# Patient Record
Sex: MALE | Race: OTHER | HISPANIC OR LATINO | Employment: UNEMPLOYED | URBAN - METROPOLITAN AREA
[De-identification: names, ages, dates, MRNs, and addresses within clinical notes are randomized per-mention and may not be internally consistent; named-entity substitution may affect disease eponyms.]

---

## 2020-07-06 ENCOUNTER — APPOINTMENT (EMERGENCY)
Dept: ULTRASOUND IMAGING | Facility: HOSPITAL | Age: 50
DRG: 192 | End: 2020-07-06
Payer: COMMERCIAL

## 2020-07-06 ENCOUNTER — HOSPITAL ENCOUNTER (INPATIENT)
Facility: HOSPITAL | Age: 50
LOS: 5 days | Discharge: HOME | DRG: 192 | End: 2020-07-11
Attending: EMERGENCY MEDICINE | Admitting: GENERAL PRACTICE
Payer: COMMERCIAL

## 2020-07-06 ENCOUNTER — APPOINTMENT (EMERGENCY)
Dept: RADIOLOGY | Facility: HOSPITAL | Age: 50
DRG: 192 | End: 2020-07-06
Payer: COMMERCIAL

## 2020-07-06 ENCOUNTER — APPOINTMENT (EMERGENCY)
Dept: CT IMAGING | Facility: HOSPITAL | Age: 50
DRG: 192 | End: 2020-07-06
Payer: COMMERCIAL

## 2020-07-06 DIAGNOSIS — I50.9 CONGESTIVE HEART FAILURE (CHF) (HCC): ICD-10-CM

## 2020-07-06 DIAGNOSIS — R60.0 BILATERAL LOWER EXTREMITY EDEMA: ICD-10-CM

## 2020-07-06 DIAGNOSIS — R79.89 ELEVATED SERUM CREATININE: ICD-10-CM

## 2020-07-06 DIAGNOSIS — L03.115 CELLULITIS OF RIGHT LOWER EXTREMITY: Primary | ICD-10-CM

## 2020-07-06 DIAGNOSIS — I10 HYPERTENSION: ICD-10-CM

## 2020-07-06 DIAGNOSIS — I50.31 ACUTE DIASTOLIC HEART FAILURE (HCC): ICD-10-CM

## 2020-07-06 PROBLEM — R35.89 POLYURIA: Status: ACTIVE | Noted: 2020-07-06

## 2020-07-06 PROBLEM — L03.116 CELLULITIS OF LEFT LOWER EXTREMITY: Status: ACTIVE | Noted: 2020-07-06

## 2020-07-06 PROBLEM — E66.01 MORBID OBESITY DUE TO EXCESS CALORIES (HCC): Status: ACTIVE | Noted: 2020-07-06

## 2020-07-06 PROBLEM — I16.0 HYPERTENSIVE URGENCY: Status: ACTIVE | Noted: 2020-07-06

## 2020-07-06 LAB
ALBUMIN SERPL BCP-MCNC: 3.4 G/DL (ref 3.5–5)
ALP SERPL-CCNC: 112 U/L (ref 46–116)
ALT SERPL W P-5'-P-CCNC: 31 U/L (ref 12–78)
ANION GAP SERPL CALCULATED.3IONS-SCNC: 5 MMOL/L (ref 4–13)
APTT PPP: 35 SECONDS (ref 23–37)
AST SERPL W P-5'-P-CCNC: 37 U/L (ref 5–45)
BASOPHILS # BLD AUTO: 0.04 THOUSANDS/ΜL (ref 0–0.1)
BASOPHILS NFR BLD AUTO: 0 % (ref 0–1)
BILIRUB SERPL-MCNC: 0.7 MG/DL (ref 0.2–1)
BUN SERPL-MCNC: 24 MG/DL (ref 5–25)
CALCIUM SERPL-MCNC: 8.7 MG/DL (ref 8.3–10.1)
CHLORIDE SERPL-SCNC: 107 MMOL/L (ref 100–108)
CO2 SERPL-SCNC: 29 MMOL/L (ref 21–32)
CREAT SERPL-MCNC: 1.98 MG/DL (ref 0.6–1.3)
D DIMER PPP FEU-MCNC: 2.02 UG/ML FEU
EOSINOPHIL # BLD AUTO: 0.06 THOUSAND/ΜL (ref 0–0.61)
EOSINOPHIL NFR BLD AUTO: 1 % (ref 0–6)
ERYTHROCYTE [DISTWIDTH] IN BLOOD BY AUTOMATED COUNT: 15 % (ref 11.6–15.1)
GFR SERPL CREATININE-BSD FRML MDRD: 38 ML/MIN/1.73SQ M
GLUCOSE SERPL-MCNC: 92 MG/DL (ref 65–140)
HCT VFR BLD AUTO: 50.4 % (ref 36.5–49.3)
HGB BLD-MCNC: 15.4 G/DL (ref 12–17)
IMM GRANULOCYTES # BLD AUTO: 0.04 THOUSAND/UL (ref 0–0.2)
IMM GRANULOCYTES NFR BLD AUTO: 0 % (ref 0–2)
INR PPP: 1.1 (ref 0.84–1.19)
LACTATE SERPL-SCNC: 0.7 MMOL/L (ref 0.5–2)
LYMPHOCYTES # BLD AUTO: 1.3 THOUSANDS/ΜL (ref 0.6–4.47)
LYMPHOCYTES NFR BLD AUTO: 14 % (ref 14–44)
MCH RBC QN AUTO: 26.2 PG (ref 26.8–34.3)
MCHC RBC AUTO-ENTMCNC: 30.6 G/DL (ref 31.4–37.4)
MCV RBC AUTO: 86 FL (ref 82–98)
MONOCYTES # BLD AUTO: 0.79 THOUSAND/ΜL (ref 0.17–1.22)
MONOCYTES NFR BLD AUTO: 9 % (ref 4–12)
NEUTROPHILS # BLD AUTO: 6.8 THOUSANDS/ΜL (ref 1.85–7.62)
NEUTS SEG NFR BLD AUTO: 76 % (ref 43–75)
NRBC BLD AUTO-RTO: 0 /100 WBCS
NT-PROBNP SERPL-MCNC: 1630 PG/ML
PLATELET # BLD AUTO: 202 THOUSANDS/UL (ref 149–390)
PMV BLD AUTO: 11.4 FL (ref 8.9–12.7)
POTASSIUM SERPL-SCNC: 5.1 MMOL/L (ref 3.5–5.3)
PROT SERPL-MCNC: 7.8 G/DL (ref 6.4–8.2)
PROTHROMBIN TIME: 14.4 SECONDS (ref 11.6–14.5)
RBC # BLD AUTO: 5.87 MILLION/UL (ref 3.88–5.62)
SARS-COV-2 RNA RESP QL NAA+PROBE: NEGATIVE
SODIUM SERPL-SCNC: 141 MMOL/L (ref 136–145)
TROPONIN I SERPL-MCNC: 0.04 NG/ML
TSH SERPL DL<=0.05 MIU/L-ACNC: 1.65 UIU/ML (ref 0.36–3.74)
WBC # BLD AUTO: 9.03 THOUSAND/UL (ref 4.31–10.16)

## 2020-07-06 PROCEDURE — 99284 EMERGENCY DEPT VISIT MOD MDM: CPT | Performed by: EMERGENCY MEDICINE

## 2020-07-06 PROCEDURE — 87635 SARS-COV-2 COVID-19 AMP PRB: CPT | Performed by: EMERGENCY MEDICINE

## 2020-07-06 PROCEDURE — 71275 CT ANGIOGRAPHY CHEST: CPT

## 2020-07-06 PROCEDURE — 87040 BLOOD CULTURE FOR BACTERIA: CPT | Performed by: EMERGENCY MEDICINE

## 2020-07-06 PROCEDURE — 85730 THROMBOPLASTIN TIME PARTIAL: CPT | Performed by: EMERGENCY MEDICINE

## 2020-07-06 PROCEDURE — 99285 EMERGENCY DEPT VISIT HI MDM: CPT

## 2020-07-06 PROCEDURE — 96365 THER/PROPH/DIAG IV INF INIT: CPT

## 2020-07-06 PROCEDURE — 93005 ELECTROCARDIOGRAM TRACING: CPT

## 2020-07-06 PROCEDURE — 85025 COMPLETE CBC W/AUTO DIFF WBC: CPT | Performed by: EMERGENCY MEDICINE

## 2020-07-06 PROCEDURE — 71045 X-RAY EXAM CHEST 1 VIEW: CPT

## 2020-07-06 PROCEDURE — 85610 PROTHROMBIN TIME: CPT | Performed by: EMERGENCY MEDICINE

## 2020-07-06 PROCEDURE — 84484 ASSAY OF TROPONIN QUANT: CPT | Performed by: EMERGENCY MEDICINE

## 2020-07-06 PROCEDURE — 96366 THER/PROPH/DIAG IV INF ADDON: CPT

## 2020-07-06 PROCEDURE — 93971 EXTREMITY STUDY: CPT

## 2020-07-06 PROCEDURE — 96375 TX/PRO/DX INJ NEW DRUG ADDON: CPT

## 2020-07-06 PROCEDURE — 85379 FIBRIN DEGRADATION QUANT: CPT | Performed by: EMERGENCY MEDICINE

## 2020-07-06 PROCEDURE — 80053 COMPREHEN METABOLIC PANEL: CPT | Performed by: EMERGENCY MEDICINE

## 2020-07-06 PROCEDURE — 36415 COLL VENOUS BLD VENIPUNCTURE: CPT | Performed by: EMERGENCY MEDICINE

## 2020-07-06 PROCEDURE — 84443 ASSAY THYROID STIM HORMONE: CPT | Performed by: EMERGENCY MEDICINE

## 2020-07-06 PROCEDURE — 99223 1ST HOSP IP/OBS HIGH 75: CPT | Performed by: GENERAL PRACTICE

## 2020-07-06 PROCEDURE — 83605 ASSAY OF LACTIC ACID: CPT | Performed by: EMERGENCY MEDICINE

## 2020-07-06 PROCEDURE — 83880 ASSAY OF NATRIURETIC PEPTIDE: CPT | Performed by: EMERGENCY MEDICINE

## 2020-07-06 RX ORDER — LABETALOL 20 MG/4 ML (5 MG/ML) INTRAVENOUS SYRINGE
20 ONCE
Status: COMPLETED | OUTPATIENT
Start: 2020-07-06 | End: 2020-07-06

## 2020-07-06 RX ORDER — LABETALOL 20 MG/4 ML (5 MG/ML) INTRAVENOUS SYRINGE
10 ONCE
Status: COMPLETED | OUTPATIENT
Start: 2020-07-06 | End: 2020-07-06

## 2020-07-06 RX ORDER — ACETAMINOPHEN 325 MG/1
650 TABLET ORAL EVERY 6 HOURS PRN
Status: DISCONTINUED | OUTPATIENT
Start: 2020-07-06 | End: 2020-07-11 | Stop reason: HOSPADM

## 2020-07-06 RX ORDER — FUROSEMIDE 10 MG/ML
40 INJECTION INTRAMUSCULAR; INTRAVENOUS
Status: DISCONTINUED | OUTPATIENT
Start: 2020-07-07 | End: 2020-07-07

## 2020-07-06 RX ORDER — FUROSEMIDE 10 MG/ML
40 INJECTION INTRAMUSCULAR; INTRAVENOUS ONCE
Status: COMPLETED | OUTPATIENT
Start: 2020-07-06 | End: 2020-07-06

## 2020-07-06 RX ORDER — CEFAZOLIN SODIUM 2 G/50ML
2000 SOLUTION INTRAVENOUS EVERY 8 HOURS
Status: DISCONTINUED | OUTPATIENT
Start: 2020-07-07 | End: 2020-07-09

## 2020-07-06 RX ORDER — LABETALOL 20 MG/4 ML (5 MG/ML) INTRAVENOUS SYRINGE
20 EVERY 4 HOURS PRN
Status: DISCONTINUED | OUTPATIENT
Start: 2020-07-06 | End: 2020-07-07

## 2020-07-06 RX ADMIN — LABETALOL 20 MG/4 ML (5 MG/ML) INTRAVENOUS SYRINGE 20 MG: at 17:29

## 2020-07-06 RX ADMIN — Medication 1000 MG: at 15:40

## 2020-07-06 RX ADMIN — LABETALOL 20 MG/4 ML (5 MG/ML) INTRAVENOUS SYRINGE 10 MG: at 15:57

## 2020-07-06 RX ADMIN — ENOXAPARIN SODIUM 60 MG: 60 INJECTION SUBCUTANEOUS at 19:25

## 2020-07-06 RX ADMIN — SODIUM CHLORIDE 1000 ML: 0.9 INJECTION, SOLUTION INTRAVENOUS at 15:56

## 2020-07-06 RX ADMIN — FUROSEMIDE 40 MG: 10 INJECTION, SOLUTION INTRAMUSCULAR; INTRAVENOUS at 17:34

## 2020-07-06 RX ADMIN — IOHEXOL 85 ML: 350 INJECTION, SOLUTION INTRAVENOUS at 16:13

## 2020-07-06 NOTE — PLAN OF CARE
Problem: Potential for Falls  Goal: Patient will remain free of falls  Description  INTERVENTIONS:  - Assess patient frequently for physical needs  -  Identify cognitive and physical deficits and behaviors that affect risk of falls    -  Uriah fall precautions as indicated by assessment   - Educate patient/family on patient safety including physical limitations  - Instruct patient to call for assistance with activity based on assessment  - Modify environment to reduce risk of injury  - Consider OT/PT consult to assist with strengthening/mobility  Outcome: Progressing     Problem: PAIN - ADULT  Goal: Verbalizes/displays adequate comfort level or baseline comfort level  Description  Interventions:  - Encourage patient to monitor pain and request assistance  - Assess pain using appropriate pain scale  - Administer analgesics based on type and severity of pain and evaluate response  - Implement non-pharmacological measures as appropriate and evaluate response  - Consider cultural and social influences on pain and pain management  - Notify physician/advanced practitioner if interventions unsuccessful or patient reports new pain  Outcome: Progressing     Problem: INFECTION - ADULT  Goal: Absence or prevention of progression during hospitalization  Description  INTERVENTIONS:  - Assess and monitor for signs and symptoms of infection  - Monitor lab/diagnostic results  - Monitor all insertion sites, i e  indwelling lines, tubes, and drains  - Monitor endotracheal if appropriate and nasal secretions for changes in amount and color  - Uriah appropriate cooling/warming therapies per order  - Administer medications as ordered  - Instruct and encourage patient and family to use good hand hygiene technique  - Identify and instruct in appropriate isolation precautions for identified infection/condition  Outcome: Progressing  Goal: Absence of fever/infection during neutropenic period  Description  INTERVENTIONS:  - Monitor WBC    Outcome: Progressing     Problem: SAFETY ADULT  Goal: Maintain or return to baseline ADL function  Description  INTERVENTIONS:  -  Assess patient's ability to carry out ADLs; assess patient's baseline for ADL function and identify physical deficits which impact ability to perform ADLs (bathing, care of mouth/teeth, toileting, grooming, dressing, etc )  - Assess/evaluate cause of self-care deficits   - Assess range of motion  - Assess patient's mobility; develop plan if impaired  - Assess patient's need for assistive devices and provide as appropriate  - Encourage maximum independence but intervene and supervise when necessary  - Involve family in performance of ADLs  - Assess for home care needs following discharge   - Consider OT consult to assist with ADL evaluation and planning for discharge  - Provide patient education as appropriate  Outcome: Progressing  Goal: Maintain or return mobility status to optimal level  Description  INTERVENTIONS:  - Assess patient's baseline mobility status (ambulation, transfers, stairs, etc )    - Identify cognitive and physical deficits and behaviors that affect mobility  - Identify mobility aids required to assist with transfers and/or ambulation (gait belt, sit-to-stand, lift, walker, cane, etc )  - Clemons fall precautions as indicated by assessment  - Record patient progress and toleration of activity level on Mobility SBAR; progress patient to next Phase/Stage  - Instruct patient to call for assistance with activity based on assessment  - Consider rehabilitation consult to assist with strengthening/weightbearing, etc   Outcome: Progressing     Problem: DISCHARGE PLANNING  Goal: Discharge to home or other facility with appropriate resources  Description  INTERVENTIONS:  - Identify barriers to discharge w/patient and caregiver  - Arrange for needed discharge resources and transportation as appropriate  - Identify discharge learning needs (meds, wound care, etc )  - Arrange for interpretive services to assist at discharge as needed  - Refer to Case Management Department for coordinating discharge planning if the patient needs post-hospital services based on physician/advanced practitioner order or complex needs related to functional status, cognitive ability, or social support system  Outcome: Progressing     Problem: Knowledge Deficit  Goal: Patient/family/caregiver demonstrates understanding of disease process, treatment plan, medications, and discharge instructions  Description  Complete learning assessment and assess knowledge base    Interventions:  - Provide teaching at level of understanding  - Provide teaching via preferred learning methods  Outcome: Progressing

## 2020-07-06 NOTE — ED NOTES
CC- Increased SOB, increased BP with unilateral (Right) Leg swelling  Admission related to injury?-     Orientation status- AAOx4    Abnormal labs/abnormal focused assessment/vitals-  Increased DDimer and BNP    Medication/drips- Pt had 2 doses of normodyne first dose (10 mg) then 20 mg afterwards  Pt currently was monitored for BP changes  40 mg Lasix given as well       Last time narcotics given-     IV lines/drains/etc- 20G RAC    Isolation status- N/A    Skin- Right leg swelling    BMAT screening tool-?     ED nurse's name and phone number- Natty Gomez 83, [phone]  07/06/20 5429

## 2020-07-06 NOTE — ED PROVIDER NOTES
History  Chief Complaint   Patient presents with    Leg Swelling     RLE swelling x1 month, new onset cough and intermittent fevers  report recent covid negative  52yo male without known medical problems, said he had "borderline" high blood pressure and DM, is coming in with bilateral leg swelling for past few weeks, but right leg worsened and became red over the past week and started with low grade fevers  History provided by:  Patient  Leg Pain   Location:  Leg  Time since incident:  1 month  Injury: no    Leg location:  R lower leg  Pain details:     Quality:  Aching and shooting    Radiates to:  Does not radiate    Severity:  Moderate    Onset quality:  Gradual  Chronicity:  New  Dislocation: no    Prior injury to area:  No  Relieved by:  Nothing  Exacerbated by: scratching  Ineffective treatments:  None tried  Associated symptoms: fever (intermittent low grade 99 fever) and swelling (bilateral leg swelling for weeks, but right leg worsened and got more red in the past week)    Associated symptoms: no back pain and no decreased ROM    Risk factors: obesity        None       Past Medical History:   Diagnosis Date    Hypertension     Obese        History reviewed  No pertinent surgical history  History reviewed  No pertinent family history  I have reviewed and agree with the history as documented  E-Cigarette/Vaping    E-Cigarette Use Never User      E-Cigarette/Vaping Substances     Social History     Tobacco Use    Smoking status: Never Smoker    Smokeless tobacco: Never Used   Substance Use Topics    Alcohol use: Not Currently    Drug use: Not Currently       Review of Systems   Constitutional: Positive for fever (intermittent low grade 99 fever)  Musculoskeletal: Negative for back pain  All other systems reviewed and are negative  Physical Exam  Physical Exam   Constitutional: He is oriented to person, place, and time  He appears well-developed and well-nourished     Morbidly obese   HENT:   Head: Normocephalic and atraumatic  Eyes: Pupils are equal, round, and reactive to light  EOM are normal    Neck: Neck supple  Cardiovascular: Normal rate and regular rhythm  Pulmonary/Chest: Effort normal  Tachypnea noted  No respiratory distress  Abdominal: Soft  Bowel sounds are normal  He exhibits no distension  There is no tenderness  Musculoskeletal: He exhibits edema (2+ pitting edema bilateral, right leg worse than left and right leg with erythema and few folliclulitis pustules and some weeping c/w cellulitis in right leg)  Neurological: He is alert and oriented to person, place, and time  No cranial nerve deficit  He exhibits normal muscle tone  Skin: There is erythema (to RLE)  Vitals reviewed        Vital Signs  ED Triage Vitals   Temperature Pulse Respirations Blood Pressure SpO2   07/06/20 1307 07/06/20 1307 07/06/20 1307 07/06/20 1308 07/06/20 1307   98 °F (36 7 °C) 98 (!) 24 (!) 237/121 94 %      Temp src Heart Rate Source Patient Position - Orthostatic VS BP Location FiO2 (%)   -- 07/06/20 1354 07/06/20 1354 07/06/20 1354 --    Monitor Lying Left arm       Pain Score       07/06/20 1307       No Pain           Vitals:    07/06/20 1354 07/06/20 1400 07/06/20 1430 07/06/20 1445   BP: (!) 221/134 (!) 220/123 (!) 215/104 (!) 213/103   Pulse: 99 100 92 92   Patient Position - Orthostatic VS: Lying Lying Lying Lying         Visual Acuity      ED Medications  Medications   ceftriaxone (ROCEPHIN) 1 g/50 mL in dextrose IVPB (has no administration in time range)   Labetalol HCl (NORMODYNE) injection 20 mg (has no administration in time range)   furosemide (LASIX) injection 40 mg (has no administration in time range)   sodium chloride 0 9 % bolus 1,000 mL (1,000 mL Intravenous New Bag 7/6/20 1556)   Labetalol HCl (NORMODYNE) injection 10 mg (10 mg Intravenous Given 7/6/20 1557)   iohexol (OMNIPAQUE) 350 MG/ML injection (MULTI-DOSE) 85 mL (85 mL Intravenous Given 7/6/20 1613) Diagnostic Studies  Results Reviewed     Procedure Component Value Units Date/Time    Lactic acid [[id number]]  (Normal) Collected:  07/06/20 1555    Lab Status:  Final result Specimen:  Blood from Arm, Left Updated:  07/06/20 1626     LACTIC ACID 0 7 mmol/L     Narrative:       Result may be elevated if tourniquet was used during collection  Blood culture #1 [[id number]] Collected:  07/06/20 1555    Lab Status: In process Specimen:  Blood from Arm, Left Updated:  07/06/20 1603    Blood culture #2 [[id number]] Collected:  07/06/20 1540    Lab Status: In process Specimen:  Blood from Hand, Right Updated:  07/06/20 1545    Novel Coronavirus (Covid-19),PCR [facility] [[id number]]  (Normal) Collected:  07/06/20 1358    Lab Status:  Final result Specimen:  Nares from Nose Updated:  07/06/20 1508     SARS-CoV-2 Negative    Narrative: The specimen collection materials, transport medium, and/or testing methodology utilized in the production of these test results have been proven to be reliable in a limited validation with an abbreviated program under the Emergency Utilization Authorization provided by the FDA  Testing reported as "Presumptive positive" will be confirmed with secondary testing with a reference laboratory to ensure result accuracy  Clinical caution and judgement should be used with the interpretation of these results with consideration of the clinical impression and other laboratory testing  Testing reported as "Positive" or "Negative" has been proven to be accurate according to standard laboratory validation requirements  All testing is performed with control materials showing appropriate reactivity at standard intervals        Comprehensive metabolic panel [[id number]]  (Abnormal) Collected:  07/06/20 1358    Lab Status:  Final result Specimen:  Blood from Arm, Right Updated:  07/06/20 1437     Sodium 141 mmol/L      Potassium 5 1 mmol/L      Chloride 107 mmol/L      CO2 29 mmol/L      ANION GAP 5 mmol/L      BUN 24 mg/dL      Creatinine 1 98 mg/dL      Glucose 92 mg/dL      Calcium 8 7 mg/dL      AST 37 U/L      ALT 31 U/L      Alkaline Phosphatase 112 U/L      Total Protein 7 8 g/dL      Albumin 3 4 g/dL      Total Bilirubin 0 70 mg/dL      eGFR 38 ml/min/1 73sq m     Narrative:       [place] guidelines for Chronic Kidney Disease (CKD):     Stage 1 with normal or high GFR (GFR > 90 mL/min/1 73 square meters)    Stage 2 Mild CKD (GFR = 60-89 mL/min/1 73 square meters)    Stage 3A Moderate CKD (GFR = 45-59 mL/min/1 73 square meters)    Stage 3B Moderate CKD (GFR = 30-44 mL/min/1 73 square meters)    Stage 4 Severe CKD (GFR = 15-29 mL/min/1 73 square meters)    Stage 5 End Stage CKD (GFR <15 mL/min/1 73 square meters)  Note: GFR calculation is accurate only with a steady state creatinine    TSH [[id number]]  (Normal) Collected:  07/06/20 1358    Lab Status:  Final result Specimen:  Blood from Arm, Right Updated:  07/06/20 1437     TSH 3RD GENERATON 1 651 uIU/mL     Narrative:       Patients undergoing fluorescein dye angiography may retain small amounts of fluorescein in the body for 48-72 hours post procedure  Samples containing fluorescein can produce falsely depressed TSH values  If the patient had this procedure,a specimen should be resubmitted post fluorescein clearance        NT-BNP PRO [[id number]]  (Abnormal) Collected:  07/06/20 1358    Lab Status:  Final result Specimen:  Blood from Arm, Right Updated:  07/06/20 1437     NT-proBNP 1,630 pg/mL     D-Dimer [[id number]]  (Abnormal) Collected:  07/06/20 1358    Lab Status:  Final result Specimen:  Blood from Arm, Right Updated:  07/06/20 1429     D-Dimer, Quant 2 02 ug/ml FEU     Troponin I [[id number]]  (Normal) Collected:  07/06/20 1358    Lab Status:  Final result Specimen:  Blood from Arm, Right Updated:  07/06/20 1429     Troponin I 0 04 ng/mL     Protime-INR [[id number]]  (Normal) Collected:  07/06/20 1358    Lab Status: Final result Specimen:  Blood from Arm, Right Updated:  07/06/20 1422     Protime 14 4 seconds      INR 1 10    APTT [[id number]]  (Normal) Collected:  07/06/20 1358    Lab Status:  Final result Specimen:  Blood from Arm, Right Updated:  07/06/20 1422     PTT 35 seconds     CBC and differential [[id number]]  (Abnormal) Collected:  07/06/20 1358    Lab Status:  Final result Specimen:  Blood from Arm, Right Updated:  07/06/20 1410     WBC 9 03 Thousand/uL      RBC 5 87 Million/uL      Hemoglobin 15 4 g/dL      Hematocrit 50 4 %      MCV 86 fL      MCH 26 2 pg      MCHC 30 6 g/dL      RDW 15 0 %      MPV 11 4 fL      Platelets 192 Thousands/uL      nRBC 0 /100 WBCs      Neutrophils Relative 76 %      Immat GRANS % 0 %      Lymphocytes Relative 14 %      Monocytes Relative 9 %      Eosinophils Relative 1 %      Basophils Relative 0 %      Neutrophils Absolute 6 80 Thousands/µL      Immature Grans Absolute 0 04 Thousand/uL      Lymphocytes Absolute 1 30 Thousands/µL      Monocytes Absolute 0 79 Thousand/µL      Eosinophils Absolute 0 06 Thousand/µL      Basophils Absolute 0 04 Thousands/µL                  CTA ED chest PE study   Final Result by Alta Briseno MD (07/06 1648)      No evidence for pulmonary embolism  Mild pulmonary vascular congestion  Small pericardial effusion  Workstation performed: [id number]         XR chest 1 view portable   Final Result by Lashell Mendez MD (07/06 1415)      No acute cardiopulmonary disease              Workstation performed: [id number]         VAS lower limb venous duplex study, unilateral/limited    (Results Pending)              Procedures  ECG 12 Lead Documentation Only  Date/Time: 7/6/2020 1:58 PM  Performed by: Clyde Amato MD  Authorized by: Clyde Amato MD     Indications / Diagnosis:  Dyspnea  Patient location:  ED  Rate:     ECG rate:  97    ECG rate assessment: normal    Rhythm:     Rhythm: sinus rhythm    ST segments:     ST segments:  Normal  T waves: T waves: normal               ED Course                                             MDM  Number of Diagnoses or Management Options  Bilateral lower extremity edema: new and requires workup  Cellulitis of right lower extremity: new and requires workup  Congestive heart failure (CHF) ([facility] [address] ): new and requires workup  Hypertension: new and requires workup     Amount and/or Complexity of Data Reviewed  Clinical lab tests: ordered and reviewed  Tests in the radiology section of CPT®: ordered and reviewed  Independent visualization of images, tracings, or specimens: yes    Risk of Complications, Morbidity, and/or Mortality  Presenting problems: high    Patient Progress  Patient progress: stable        Disposition  Final diagnoses:   Cellulitis of right lower extremity   Bilateral lower extremity edema   Hypertension   Congestive heart failure (CHF) ([facility] )     Time reflects when diagnosis was documented in both MDM as applicable and the Disposition within this note     Time User Action Codes Description Comment    7/6/2020  5:10 PM Sam WREN Add [L03 115] Cellulitis of right lower extremity     7/6/2020  5:10 PM Lynette Baca, [address] [R60 0] Bilateral lower extremity edema     7/6/2020  5:10 PM Lynette Baca [address] [facility] Hypertension     7/6/2020  5:11 PM Lynette Baca [address] [I50 9] Congestive heart failure (CHF) [facility])       ED Disposition     ED Disposition Condition Date/Time Comment    Admit Stable Mon Jul 6, 2020  5:10 PM Case was discussed with Janice Mcelroy and the patient's admission status was agreed to be Admission Status: inpatient status to the service of Dr Janice Mcelroy   Follow-up Information    None         Patient's Medications    No medications on file     No discharge procedures on file      PDMP Review     None          ED Provider  Electronically Signed by           Raheem Duran MD  07/06/20 2796

## 2020-07-06 NOTE — H&P
H&P- Hilaria Bruno 1970, 44 y o  male MRN: [id number]    Unit/Bed#: ED 23 Encounter: [id number]    Primary Care Provider: José Antonio Brian PA-C   Date and time admitted to hospital: 7/6/2020  1:37 PM        * Acute heart failure [facility])  Assessment & Plan  Wt Readings from Last 3 Encounters:   07/06/20 (!) 166 kg (365 lb)     Check echo  IV Lasix  Daily weights  Is/Os  Cardio consult          Morbid obesity due to excess calories (HCC)  Assessment & Plan  RD consult  TSH WNL    Cellulitis of left lower extremity  Assessment & Plan  Rocephin given in ER  No sepsis  IV Ancef  F/u B Cx and AM procal    Polyuria  Assessment & Plan  Bladder scan  A1C    Elevated serum creatinine  Assessment & Plan  Estimated Creatinine Clearance: 69 9 mL/min (A) (by C-G formula based on SCr of 1 98 mg/dL (H))  Renal consult in setting of volume overload  Watch Cr w/ Lasix and getting dye load for CTA  UA    Hypertensive urgency  Assessment & Plan  Labetalol IV to get SBP under 190  Cardio and renal consults      VTE Prophylaxis: Enoxaparin (Lovenox)  / sequential compression device   Code Status: Full  POLST: POLST form is not discussed and not completed at this time  Discussion with family: no    Anticipated Length of Stay:  Patient will be admitted on an Inpatient basis with an anticipated length of stay of  At least 2 midnights  Justification for Hospital Stay: need to tx acute CHF    Total Time for Visit, including Counseling / Coordination of Care: 45 minutes  Greater than 50% of this total time spent on direct patient counseling and coordination of care  Chief Complaint:   LE swelling    History of Present Illness:    Hilaria Bruno is a 44 y o  male w/ morbid obesity who presents with LE swelling worsening over past month  Pt also notes LE pruritis and has been scratching  PT also admis to [facility]  Was taking albuterol inh w/ no relief  No fevers or CP or n/v/d  Admits to polyuria  Has not seen PCP for 1 year    That PCP was in [place] and pt does not have PCP at [facility] time  Review of Systems:    Review of Systems   Constitutional: Negative  HENT: Negative  Eyes: Negative  Respiratory: Positive for shortness of breath  Cardiovascular: Positive for leg swelling  Gastrointestinal: Negative  Endocrine: Negative  Genitourinary: Positive for frequency  Musculoskeletal: Negative  Skin: Negative  Allergic/Immunologic: Negative  Neurological: Negative  Hematological: Negative  Psychiatric/Behavioral: Negative  Past Medical and Surgical History:     Past Medical History:   Diagnosis Date    Hypertension     Obese        History reviewed  No pertinent surgical history  Meds/Allergies:    Prior to Admission medications    Not on File     Pt takes no meds    Allergies: No Known Allergies    Social History:     Marital Status:      Substance Use History:   Social History     Substance and Sexual Activity   Alcohol Use Not Currently     Social History     Tobacco Use   Smoking Status Never Smoker   Smokeless Tobacco Never Used     Social History     Substance and Sexual Activity   Drug Use Not Currently       Family History:    History reviewed  No pertinent family history  Physical Exam:     Vitals:   Blood Pressure: (!) 200/120 (07/06/20 1700)  Pulse: 87 (07/06/20 1700)  Temperature: 98 °F (36 7 °C) (07/06/20 1307)  Respirations: (!) 29 (07/06/20 1700)  Height: 5' 11" (180 3 cm) (07/06/20 1307)  Weight - Scale: (!) 166 kg (365 lb) (07/06/20 1307)  SpO2: 94 % (07/06/20 1700)    Physical Exam   Constitutional: He is oriented to person, place, and time  No distress  HENT:   Head: Normocephalic and atraumatic  Eyes: Conjunctivae and EOM are normal    Neck: Normal range of motion  Neck supple  Cardiovascular: Normal rate and regular rhythm  Pulmonary/Chest: Effort normal and breath sounds normal  He has no wheezes  He has no rales     Abdominal: Bowel sounds are normal  There is no tenderness  obese   Musculoskeletal: Normal range of motion  He exhibits edema  Neurological: He is alert and oriented to person, place, and time  Skin: Skin is warm and dry  He is not diaphoretic  Additional Data:     Lab Results: I have personally reviewed pertinent reports  Results from last 7 days   Lab Units 07/06/20  1358   WBC Thousand/uL 9 03   HEMOGLOBIN g/dL 15 4   HEMATOCRIT % 50 4*   PLATELETS Thousands/uL 202   NEUTROS PCT % 76*   LYMPHS PCT % 14   MONOS PCT % 9   EOS PCT % 1     Results from last 7 days   Lab Units 07/06/20  1358   SODIUM mmol/L 141   POTASSIUM mmol/L 5 1   CHLORIDE mmol/L 107   CO2 mmol/L 29   BUN mg/dL 24   CREATININE mg/dL 1 98*   ANION GAP mmol/L 5   CALCIUM mg/dL 8 7   ALBUMIN g/dL 3 4*   TOTAL BILIRUBIN mg/dL 0 70   ALK PHOS U/L 112   ALT U/L 31   AST U/L 37   GLUCOSE RANDOM mg/dL 92     Results from last 7 days   Lab Units 07/06/20  1358   INR  1 10             Results from last 7 days   Lab Units 07/06/20  1555   LACTIC ACID mmol/L 0 7       Imaging: I have personally reviewed pertinent reports  CTA ED chest PE study   Final Result by Gordo Dinh MD (07/06 1648)      No evidence for pulmonary embolism  Mild pulmonary vascular congestion  Small pericardial effusion  Workstation performed: [id number]         XR chest 1 view portable   Final Result by Yemi Palmer MD (07/06 1415)      No acute cardiopulmonary disease  Workstation performed: [id number]         VAS lower limb venous duplex study, unilateral/limited    (Results Pending)       EKG, Pathology, and Other Studies Reviewed on Admission:   · EKG: NSR    Allscripts / Epic Records Reviewed: Yes     ** Please Note: This note has been constructed using a voice recognition system   **

## 2020-07-06 NOTE — ASSESSMENT & PLAN NOTE
Estimated Creatinine Clearance: 69 9 mL/min (A) (by C-G formula based on SCr of 1 98 mg/dL (H))    Renal consult in setting of volume overload  Watch Cr w/ Lasix and getting dye load for CTA  UA

## 2020-07-06 NOTE — ASSESSMENT & PLAN NOTE
Wt Readings from Last 3 Encounters:   07/06/20 (!) 166 kg (365 lb)     Check echo  IV Lasix  Daily weights  Is/Os  Cardio consult

## 2020-07-07 ENCOUNTER — APPOINTMENT (INPATIENT)
Dept: ULTRASOUND IMAGING | Facility: HOSPITAL | Age: 50
DRG: 192 | End: 2020-07-07
Payer: COMMERCIAL

## 2020-07-07 PROBLEM — G47.00 INSOMNIA: Status: ACTIVE | Noted: 2020-07-07

## 2020-07-07 LAB
25(OH)D3 SERPL-MCNC: 13.3 NG/ML (ref 30–100)
ANION GAP SERPL CALCULATED.3IONS-SCNC: 7 MMOL/L (ref 4–13)
BACTERIA UR QL AUTO: ABNORMAL /HPF
BILIRUB UR QL STRIP: NEGATIVE
BUN SERPL-MCNC: 24 MG/DL (ref 5–25)
CALCIUM SERPL-MCNC: 8.2 MG/DL (ref 8.3–10.1)
CHLORIDE SERPL-SCNC: 107 MMOL/L (ref 100–108)
CLARITY UR: CLEAR
CO2 SERPL-SCNC: 27 MMOL/L (ref 21–32)
COLOR UR: YELLOW
CREAT SERPL-MCNC: 1.82 MG/DL (ref 0.6–1.3)
ERYTHROCYTE [DISTWIDTH] IN BLOOD BY AUTOMATED COUNT: 15.3 % (ref 11.6–15.1)
EST. AVERAGE GLUCOSE BLD GHB EST-MCNC: 114 MG/DL
GFR SERPL CREATININE-BSD FRML MDRD: 42 ML/MIN/1.73SQ M
GLUCOSE SERPL-MCNC: 101 MG/DL (ref 65–140)
GLUCOSE UR STRIP-MCNC: NEGATIVE MG/DL
HBA1C MFR BLD: 5.6 %
HCT VFR BLD AUTO: 46.1 % (ref 36.5–49.3)
HGB BLD-MCNC: 14.2 G/DL (ref 12–17)
HGB UR QL STRIP.AUTO: ABNORMAL
KETONES UR STRIP-MCNC: NEGATIVE MG/DL
LEUKOCYTE ESTERASE UR QL STRIP: NEGATIVE
MAGNESIUM SERPL-MCNC: 2.1 MG/DL (ref 1.6–2.6)
MCH RBC QN AUTO: 26.5 PG (ref 26.8–34.3)
MCHC RBC AUTO-ENTMCNC: 30.8 G/DL (ref 31.4–37.4)
MCV RBC AUTO: 86 FL (ref 82–98)
NITRITE UR QL STRIP: NEGATIVE
NON-SQ EPI CELLS URNS QL MICRO: ABNORMAL /HPF
PH UR STRIP.AUTO: 6 [PH]
PHOSPHATE SERPL-MCNC: 3.9 MG/DL (ref 2.7–4.5)
PLATELET # BLD AUTO: 189 THOUSANDS/UL (ref 149–390)
PMV BLD AUTO: 11.4 FL (ref 8.9–12.7)
POTASSIUM SERPL-SCNC: 3.9 MMOL/L (ref 3.5–5.3)
PROCALCITONIN SERPL-MCNC: <0.05 NG/ML
PROT UR STRIP-MCNC: ABNORMAL MG/DL
PTH-INTACT SERPL-MCNC: 198 PG/ML (ref 18.4–80.1)
RBC # BLD AUTO: 5.36 MILLION/UL (ref 3.88–5.62)
RBC #/AREA URNS AUTO: ABNORMAL /HPF
SODIUM SERPL-SCNC: 141 MMOL/L (ref 136–145)
SP GR UR STRIP.AUTO: 1.01 (ref 1–1.03)
TROPONIN I SERPL-MCNC: 0.05 NG/ML
TROPONIN I SERPL-MCNC: 0.06 NG/ML
UROBILINOGEN UR QL STRIP.AUTO: 0.2 E.U./DL
WBC # BLD AUTO: 8.15 THOUSAND/UL (ref 4.31–10.16)
WBC #/AREA URNS AUTO: ABNORMAL /HPF

## 2020-07-07 PROCEDURE — 81001 URINALYSIS AUTO W/SCOPE: CPT | Performed by: GENERAL PRACTICE

## 2020-07-07 PROCEDURE — 83970 ASSAY OF PARATHORMONE: CPT | Performed by: INTERNAL MEDICINE

## 2020-07-07 PROCEDURE — 84100 ASSAY OF PHOSPHORUS: CPT | Performed by: GENERAL PRACTICE

## 2020-07-07 PROCEDURE — 93971 EXTREMITY STUDY: CPT | Performed by: SURGERY

## 2020-07-07 PROCEDURE — 85027 COMPLETE CBC AUTOMATED: CPT | Performed by: GENERAL PRACTICE

## 2020-07-07 PROCEDURE — 93005 ELECTROCARDIOGRAM TRACING: CPT

## 2020-07-07 PROCEDURE — 84145 PROCALCITONIN (PCT): CPT | Performed by: GENERAL PRACTICE

## 2020-07-07 PROCEDURE — 83735 ASSAY OF MAGNESIUM: CPT | Performed by: GENERAL PRACTICE

## 2020-07-07 PROCEDURE — 83036 HEMOGLOBIN GLYCOSYLATED A1C: CPT | Performed by: GENERAL PRACTICE

## 2020-07-07 PROCEDURE — 99254 IP/OBS CNSLTJ NEW/EST MOD 60: CPT | Performed by: INTERNAL MEDICINE

## 2020-07-07 PROCEDURE — 76770 US EXAM ABDO BACK WALL COMP: CPT

## 2020-07-07 PROCEDURE — 94762 N-INVAS EAR/PLS OXIMTRY CONT: CPT

## 2020-07-07 PROCEDURE — 80048 BASIC METABOLIC PNL TOTAL CA: CPT | Performed by: GENERAL PRACTICE

## 2020-07-07 PROCEDURE — 99255 IP/OBS CONSLTJ NEW/EST HI 80: CPT | Performed by: INTERNAL MEDICINE

## 2020-07-07 PROCEDURE — 84484 ASSAY OF TROPONIN QUANT: CPT | Performed by: INTERNAL MEDICINE

## 2020-07-07 PROCEDURE — 82306 VITAMIN D 25 HYDROXY: CPT | Performed by: INTERNAL MEDICINE

## 2020-07-07 PROCEDURE — 84484 ASSAY OF TROPONIN QUANT: CPT | Performed by: PHYSICIAN ASSISTANT

## 2020-07-07 PROCEDURE — 99233 SBSQ HOSP IP/OBS HIGH 50: CPT | Performed by: INTERNAL MEDICINE

## 2020-07-07 RX ORDER — HYDRALAZINE HYDROCHLORIDE 20 MG/ML
10 INJECTION INTRAMUSCULAR; INTRAVENOUS EVERY 6 HOURS PRN
Status: DISCONTINUED | OUTPATIENT
Start: 2020-07-07 | End: 2020-07-11 | Stop reason: HOSPADM

## 2020-07-07 RX ORDER — CLONIDINE HYDROCHLORIDE 0.1 MG/1
0.2 TABLET ORAL EVERY 12 HOURS SCHEDULED
Status: DISCONTINUED | OUTPATIENT
Start: 2020-07-07 | End: 2020-07-08

## 2020-07-07 RX ORDER — CARVEDILOL 12.5 MG/1
12.5 TABLET ORAL 2 TIMES DAILY WITH MEALS
Status: DISCONTINUED | OUTPATIENT
Start: 2020-07-07 | End: 2020-07-10

## 2020-07-07 RX ORDER — FUROSEMIDE 10 MG/ML
40 INJECTION INTRAMUSCULAR; INTRAVENOUS ONCE
Status: DISCONTINUED | OUTPATIENT
Start: 2020-07-07 | End: 2020-07-07

## 2020-07-07 RX ORDER — HYDRALAZINE HYDROCHLORIDE 20 MG/ML
10 INJECTION INTRAMUSCULAR; INTRAVENOUS EVERY 6 HOURS PRN
Status: DISCONTINUED | OUTPATIENT
Start: 2020-07-07 | End: 2020-07-07

## 2020-07-07 RX ORDER — CLONIDINE HYDROCHLORIDE 0.1 MG/1
0.1 TABLET ORAL ONCE
Status: DISCONTINUED | OUTPATIENT
Start: 2020-07-07 | End: 2020-07-07

## 2020-07-07 RX ORDER — FUROSEMIDE 10 MG/ML
80 INJECTION INTRAMUSCULAR; INTRAVENOUS
Status: DISCONTINUED | OUTPATIENT
Start: 2020-07-07 | End: 2020-07-08

## 2020-07-07 RX ORDER — LABETALOL 20 MG/4 ML (5 MG/ML) INTRAVENOUS SYRINGE
20 ONCE
Status: COMPLETED | OUTPATIENT
Start: 2020-07-07 | End: 2020-07-07

## 2020-07-07 RX ORDER — LANOLIN ALCOHOL/MO/W.PET/CERES
9 CREAM (GRAM) TOPICAL
Status: DISCONTINUED | OUTPATIENT
Start: 2020-07-07 | End: 2020-07-11 | Stop reason: HOSPADM

## 2020-07-07 RX ORDER — AMLODIPINE BESYLATE 10 MG/1
10 TABLET ORAL DAILY
Status: DISCONTINUED | OUTPATIENT
Start: 2020-07-07 | End: 2020-07-08

## 2020-07-07 RX ADMIN — FUROSEMIDE 40 MG: 10 INJECTION, SOLUTION INTRAMUSCULAR; INTRAVENOUS at 08:59

## 2020-07-07 RX ADMIN — ENOXAPARIN SODIUM 60 MG: 60 INJECTION SUBCUTANEOUS at 08:59

## 2020-07-07 RX ADMIN — LABETALOL 20 MG/4 ML (5 MG/ML) INTRAVENOUS SYRINGE 20 MG: at 00:06

## 2020-07-07 RX ADMIN — CARVEDILOL 12.5 MG: 12.5 TABLET, FILM COATED ORAL at 12:43

## 2020-07-07 RX ADMIN — CARVEDILOL 12.5 MG: 12.5 TABLET, FILM COATED ORAL at 17:06

## 2020-07-07 RX ADMIN — CEFAZOLIN SODIUM 2000 MG: 2 SOLUTION INTRAVENOUS at 08:59

## 2020-07-07 RX ADMIN — ENOXAPARIN SODIUM 60 MG: 60 INJECTION SUBCUTANEOUS at 17:06

## 2020-07-07 RX ADMIN — HYDRALAZINE HYDROCHLORIDE 10 MG: 20 INJECTION INTRAMUSCULAR; INTRAVENOUS at 13:54

## 2020-07-07 RX ADMIN — FUROSEMIDE 80 MG: 10 INJECTION, SOLUTION INTRAMUSCULAR; INTRAVENOUS at 17:06

## 2020-07-07 RX ADMIN — LABETALOL 20 MG/4 ML (5 MG/ML) INTRAVENOUS SYRINGE 20 MG: at 02:02

## 2020-07-07 RX ADMIN — HYDRALAZINE HYDROCHLORIDE 10 MG: 20 INJECTION INTRAMUSCULAR; INTRAVENOUS at 23:29

## 2020-07-07 RX ADMIN — CLONIDINE HYDROCHLORIDE 0.2 MG: 0.1 TABLET ORAL at 12:43

## 2020-07-07 RX ADMIN — CLONIDINE HYDROCHLORIDE 0.2 MG: 0.1 TABLET ORAL at 21:14

## 2020-07-07 RX ADMIN — AMLODIPINE BESYLATE 10 MG: 10 TABLET ORAL at 04:00

## 2020-07-07 RX ADMIN — CEFAZOLIN SODIUM 2000 MG: 2 SOLUTION INTRAVENOUS at 17:06

## 2020-07-07 NOTE — ASSESSMENT & PLAN NOTE
Patients BP today is 182/119 decreased from admission (231/128)  He is currently on   Lasix IV 40 BD,   Amlodipine 10,   Coreg 6 25 bid  clonidine 0 2 bid  and hydralazine PRN for sbp >160

## 2020-07-07 NOTE — ASSESSMENT & PLAN NOTE
Patients creatnine decreased today from 1 98 to 1 82  Patient on Lasix IV  40 mg BD  Possible CKD given context of uncontrolled HTN  Patient to undergo US bladder and kidneys, per nephro recommendations

## 2020-07-07 NOTE — QUICK NOTE
Hypertensive urgency  Labetalol 20mg x2 given  BP continues to be >200  Level of care updated, Cardene gtt ordered  Po Clonidine ordered    Discussed with ALEXANDER Montes of ICU who will see patient

## 2020-07-07 NOTE — PROGRESS NOTES
Progress Note - Marquita Mccormick 1970, 44 y o  male MRN: [id number]    Unit/Bed#: -01 Encounter: [id number]    Primary Care Provider: Shanna Soto PA-C   Date and time admitted to hospital: 7/6/2020  1:37 PM        Insomnia  Assessment & Plan  Patient reports insomnia  Currently being given melatonin 9mg, PRN  Morbid obesity due to excess calories ([address] )  Assessment & Plan  RD consult, A1C: 5 7  TSH normal    Cellulitis of left lower extremity  Assessment & Plan  Patient inititially presented with erythema and tenderness on the R  Leg  He reports that he had blisters on the area that he scratched and ruptured  He received rocephin in ER  He is currently receiving cefazolin 2g three times a day, 7/20  Blood cultures are pending  Afebrile today, WBC levels normal today  Polyuria  Assessment & Plan  Bladder scan  A1C 5 4  Patient currently on IV lasix 40 mg BD  Elevated serum creatinine  Assessment & Plan  Patients creatnine decreased today from 1 98 to 1 82  Patient on Lasix IV  40 mg BD  Possible CKD given context of uncontrolled HTN  Patient to undergo US bladder and kidneys, per nephro recommendations  Hypertensive urgency  Assessment & Plan  Patients BP today is 182/119 decreased from admission (231/128)  He is currently on   Lasix IV 40 BD,   Amlodipine 10,   Coreg 6 25 bid  clonidine 0 2 bid  and hydralazine PRN for sbp >160  * Acute heart failure (HCC)  Assessment & Plan  Wt Readings from Last 3 Encounters:   07/07/20 (!) 170 kg (375 lb 3 6 oz)       Patient on IV lasix 40  BD  Blood pressure today is 182/119 down from admission (237/124)  Xray chest is normal, echo pending  Patient reports an increase in urine output            VTE Pharmacologic Prophylaxis:   Pharmacologic: Enoxaparin (Lovenox)  Mechanical VTE Prophylaxis in Place: Yes    Discussions with Specialists or Other Care Team Provider: nephrology    Education and Discussions with Family / Patient: attending discussed with patient at bedside    Current Length of Stay: 1 day(s)    Current Patient Status: Inpatient     Discharge Plan / Estimated Discharge Date: pending    Code Status: Level 1 - Full Code      Subjective:   Patient reports that he is doing well  He reports that his leg swelling has gone significantly down and that there is a decrease in pain  He is able to ambulate  He states that his SOB has also reduced significantly  He states that he has had an increase in frequency of urination  Objective:     Vitals:   Temp (24hrs), Av 8 °F (37 1 °C), Min:98 6 °F (37 °C), Max:99 3 °F (37 4 °C)    Temp:  [98 6 °F (37 °C)-99 3 °F (37 4 °C)] 98 8 °F (37 1 °C)  HR:  [72-89] 80  Resp:  [16-29] 17  BP: (172-220)/(100-135) 172/112  SpO2:  [90 %-96 %] 93 %  Body mass index is 52 33 kg/m²  Input and Output Summary (last 24 hours): Intake/Output Summary (Last 24 hours) at 2020 1506  Last data filed at 2020 1300  Gross per 24 hour   Intake 720 ml   Output 1300 ml   Net -580 ml       Physical Exam:     Physical Exam   Constitutional: He is oriented to person, place, and time  Morbidly obese   HENT:   Head: Normocephalic and atraumatic  Right Ear: External ear normal    Left Ear: External ear normal    Eyes: EOM are normal    Neck: Normal range of motion  Cardiovascular: Normal rate and regular rhythm  Pulmonary/Chest: Effort normal    Abdominal: Soft  He exhibits no distension  Musculoskeletal:        Right ankle: He exhibits decreased range of motion and swelling  Tenderness  Neurological: He is alert and oriented to person, place, and time  Psychiatric: He has a normal mood and affect   His behavior is normal            Additional Data:     Labs:    Results from last 7 days   Lab Units 20  0504 20  1358   WBC Thousand/uL 8 15 9 03   HEMOGLOBIN g/dL 14 2 15 4   HEMATOCRIT % 46 1 50 4*   PLATELETS Thousands/uL 189 202   NEUTROS PCT %  --  76*   LYMPHS PCT %  --  14   MONOS PCT %  --  9 EOS PCT %  --  1     Results from last 7 days   Lab Units 07/07/20  0504 07/06/20  1358   POTASSIUM mmol/L 3 9 5 1   CHLORIDE mmol/L 107 107   CO2 mmol/L 27 29   BUN mg/dL 24 24   CREATININE mg/dL 1 82* 1 98*   CALCIUM mg/dL 8 2* 8 7   ALK PHOS U/L  --  112   ALT U/L  --  31   AST U/L  --  37     Results from last 7 days   Lab Units 07/06/20  1358   INR  1 10       * I Have Reviewed All Lab Data Listed Above  * Additional Pertinent Lab Tests Reviewed: All Labs Within Last 24 Hours Reviewed    Imaging:    Imaging Reports Reviewed Today Include: VAS lower limb venous duplex, CTA ED chest PE, Xray chest  Imaging Personally Reviewed by Myself Includes: VAS lower limb venous duplex, CTA ED chest PE, Xray chest       Recent Cultures (last 7 days):     Results from last 7 days   Lab Units 07/06/20  1555 07/06/20  1540   BLOOD CULTURE  Received in Microbiology Lab  Culture in Progress  Received in Microbiology Lab  Culture in Progress  Last 24 Hours Medication List:     Current Facility-Administered Medications:  acetaminophen 650 mg Oral Q6H PRN Ivette Sunnyside, DO    amLODIPine 10 mg Oral Daily ABEL Maldonado    carvedilol 12 5 mg Oral BID With Meals Shirin Neumann MD    cefazolin 2,000 mg Intravenous Q8H Ivette Sunnyside, DO Last Rate: 2,000 mg (07/07/20 0859)   cloNIDine 0 2 mg Oral Q12H Ruth Vaughn MD    enoxaparin 60 mg Subcutaneous BID Ivette Sunnyside, DO    furosemide 40 mg Intravenous BID (diuretic) Ivette Sunnyside, DO    hydrALAZINE 10 mg Intravenous Q6H PRN Angelica Miller MD    melatonin 9 mg Oral HS PRN Wilfredo Zuleta MD         Today, Patient Was Seen By: Wilfredo Zuleta MD    ** Please Note: This note has been constructed using a voice recognition system   **

## 2020-07-07 NOTE — QUICK NOTE
Progress Note  Triage Assrory Kirk 44 y o  male MRN: [id number]    Time Called ( Time): 0300  Date Called: 07/07/20  Room#: 56  Person requesting evaluation: ISAURA Sweeney    Situation:    Pt is a 44year old male who presented to the hospital yesterday with worsening LE swelling and CAMPBELL  He has not seen a PCP in over a year  He reports that he was told he had borderline HTN previously  On admission his BP was 237/121  He was given a dose of lasix and 2 doses of labetalol during the day  Nursing called the on call [facility] provider tonneto because he remains HTN  On exam he is A, A, Ox3, in NAD, S1, S2, RRR, Lungs decreased, distant, abd soft, NT/ND +BS, has +2-3 LE pitting edema, neuro nonfocal         Interventions:   Initial SBP on arrival was 237, would receommend approx 20% reduction within 24 hours with goal SBP between 180-200  Would give additional dose of lasix now  Would start amlodipine for HTN, will add PRN hydralazine  Could also consider adding clonidine  Agree with echo  Will also check repeat EKG  Will check additional troponin  Cardiology and nephrology consult pending  Monitor renal indices adn I/O and daily weights closely in light of SUMI versus CKD  Triage Assessment:     Pt can remain on med-surg tele under [facility]'s service  Recommendations discussed with ISAURA Sweeney

## 2020-07-07 NOTE — CONSULTS
Consultation - Cardiology   Suyapa Kirk 44 y o  male MRN: [id number]  Unit/Bed#: -01 Encounter: [id number]  07/07/20  9:57 AM    Assessment/ Plan:  1  Asymmetrical lower extremity edema/ Exertional shortness      -right >left likely secondary to cellulitis in addition to peripheral edema  - DVT ruled out, mild cellulitis in the right leg noted  - Peripheral edema be due to venous insufficiency versus fluid overload from cardiac dysfunction  - Mildly elevated troponin 0 05 likely due to demand ischemia    Plan  ECHO ordered to assess cardiac function  Trend troponins 6 hours apart x2, to evaluate ongoing ischemia  Increase IV Lasix to 80 mg twice a day  Antibiotics per primary team   Check A1c, lipid panel  monitor I/O, weight  Nutrition consult   Patient may require ischemia workup once blood pressure is controlled        2  Hypertensive Emergency  - Uncontrolled blood pressure SBP in 200s with evidence of kidney injury,   - BP improved to SBP 170s,  DBP- 110s  - Currently on amlodipine 10 mg, carvedilol 12 5 mg twice daily and clonidin added by nephrology  - monitor BP  - patient also needs workup for sleep apnea, check nocturnal pulse oximetry      3  SUMI vs CKD due to uncontrolled hypertension  -- Baseline Cr is unknown   -- nephrology on board for further management     4  Morbidity  obesity- weight loss recommended            History of Present Illness   Physician Requesting Consult: Bro Soriano MD  Reason for Consult / Principal Problem: SOB/leg swelling   HPI: Suyapa Kirk is a 44y o  year old male with past medical history of hypertension presented to the emergency room with complaints of lower extremity swelling right more than the and cough  Patient mentioned he started to have right leg swelling about 4 weeks ago which was intermittent but for the last 1 week the right knee was swollen persistently getting worse  He also noticed a small physicals which ruptured    She also noted some itching in the right leg  Patient also mentioned he has been having mild cough for about same duration productive of clear sputum  He denies any fever or chills  He denies chest pain  Patient mentioned he was having exertional shortness of breath for about the same duration as well  Denies any palpitations  Of note patient was told he has a borderline blood pressure about a year and half ago when he visited ED  Patient mentions was given antihypertensive medication which he took for month and did not refill  During the same ED visit patient was told he has borderline diabetes as well but was not on any medication  He denies any abdominal or urinary complaints  On presentation to the emergency room patient was noted to have high /121  Overnight required several doses of labetalol  Also started on amlodipine 10 mg  CTA chest was done and ruled out PE and showed mild vascular congestion and small Madeline cardial effusion  Venous duplex ultrasound was negative for DVT  On blood work initial troponin was negative, D-dimer is elevated, proBNP was 1630  Patient started on IV diuretics, Lasix 40 b i d  her pressure has improved to 644 systolic  Inpatient consult to Cardiology     Performed by  Victoria Madera MD     Authorized by Lance Ramsey DO              EKG: Unavailable       Review of Systems   Constitutional: Negative  HENT: Negative  Eyes: Negative  Respiratory: Positive for cough and shortness of breath  Negative for chest tightness and wheezing  Cardiovascular: Positive for leg swelling  Negative for chest pain and palpitations  Gastrointestinal: Negative for abdominal distention, diarrhea, nausea and vomiting  Genitourinary: Negative  Musculoskeletal: Negative  Skin:        Blisters on R leg   Neurological: Negative  Psychiatric/Behavioral: Negative  Historical Information   Past Medical History:   Diagnosis Date    Hypertension     Obese      History reviewed  No pertinent surgical history  Social History     Substance and Sexual Activity   Alcohol Use Not Currently     Social History     Substance and Sexual Activity   Drug Use Not Currently     Social History     Tobacco Use   Smoking Status Never Smoker   Smokeless Tobacco Never Used       Family History: History reviewed  No pertinent family history  Meds/Allergies   all current active meds have been reviewed  No Known Allergies    Objective   Vitals: Blood pressure (!) 174/106, pulse 72, temperature 98 6 °F (37 °C), resp  rate 17, height 5' 11" (1 803 m), weight (!) 170 kg (375 lb 3 6 oz), SpO2 90 %  , Body mass index is 52 33 kg/m² ,   Orthostatic Blood Pressures      Most Recent Value   Blood Pressure  (!) 174/106 filed at 07/07/2020 0757   Patient Position - Orthostatic VS  Lying filed at 07/07/2020 1709          Systolic (93JUP), PNI:425 , Min:174 , PEZ:786     Diastolic (39QRI), JVX:352, Min:100, Max:135        Intake/Output Summary (Last 24 hours) at 7/7/2020 0957  Last data filed at 7/7/2020 8931  Gross per 24 hour   Intake 120 ml   Output 700 ml   Net -580 ml       Invasive Devices     Peripheral Intravenous Line            Peripheral IV 07/06/20 Right Antecubital less than 1 day                    Physical Exam:  GEN: Alert and oriented x 3, in no acute distress  Obese  HEENT: Sclera anicteric, conjunctivae pink, mucous membranes moist  Oropharynx clear  NECK: Supple, no carotid bruits,difficult to look for JVD,   HEART: Regular rhythm, normal S1 and S2, no murmurs, clicks, gallops or rubs  LUNGS: Clear to auscultation bilaterally; no wheezes, rales, or rhonchi  No increased work of breathing or signs of respiratory distress  ABDOMEN: Soft, nontender, nondistended, normoactive bowel sounds  EXTREMITIES: peritibial pedal  3+ edema on right leg, slightly erythema, nontender, 2+ edema on the left leg had   NEURO: No focal findings  Normal speech   Mood and affect normal    SKIN:  Areas of ruptured blisters on the right anterior leg slight erythema    Lab Results:     Troponins:   Results from last 7 days   Lab Units 07/07/20  0504 07/06/20  1358   TROPONIN I ng/mL 0 05* 0 04       CBC with diff:   Results from last 7 days   Lab Units 07/07/20  0504 07/06/20  1358   WBC Thousand/uL 8 15 9 03   HEMOGLOBIN g/dL 14 2 15 4   HEMATOCRIT % 46 1 50 4*   MCV fL 86 86   PLATELETS Thousands/uL 189 202   MCH pg 26 5* 26 2*   MCHC g/dL 30 8* 30 6*   RDW % 15 3* 15 0   MPV fL 11 4 11 4   NRBC AUTO /100 WBCs  --  0         CMP:   Results from last 7 days   Lab Units 07/07/20  0504 07/06/20  1358   POTASSIUM mmol/L 3 9 5 1   CHLORIDE mmol/L 107 107   CO2 mmol/L 27 29   BUN mg/dL 24 24   CREATININE mg/dL 1 82* 1 98*   CALCIUM mg/dL 8 2* 8 7   AST U/L  --  37   ALT U/L  --  31   ALK PHOS U/L  --  112   EGFR ml/min/1 73sq m 42 38

## 2020-07-07 NOTE — ASSESSMENT & PLAN NOTE
Patient chao presented with erythema and tenderness on the R  Leg  He reports that he had blisters on the area that he scratched and ruptured  He received rocephin in ER  He is currently receiving cefazolin 2g three times a day, 7/20  Blood cultures are pending  Afebrile today, WBC levels normal today

## 2020-07-07 NOTE — PROGRESS NOTES
Critical care NP at bedside to evaluate patient  Will enter new med orders  NP to discuss with team if patient to be transfered to unit  Will continue to monitor patient

## 2020-07-07 NOTE — CONSULTS
NEPHROLOGY CONSULTATION NOTE    Patient: All Valadez               Sex: male          DOA: 7/6/2020  1:37 PM   YOB: 1970        Age:  44 y o         LOS:  LOS: 1 day     REFERRING PHYSICIAN:  Dr Yaya Reyes / Jose Herrera:  Acute kidney injury    DATE OF CONSULTATION / SERVICE: 7/7/2020    ADMISSION DIAGNOSIS: Acute heart failure ([address] )     CHIEF COMPLAINT     Lower extremity edema    HPI     This is a 47-year-old male with past medical history of obesity, elevated blood pressure noted approximately 1 year ago secondary to excess use of Afrin spray who presents with several week history of lower extremity edema  Patient moved to [place] approximately 6 months ago from [place]  Admits to having poor follow-up with physicians  States that approximately 1 year ago he was seen at a local hospital in [place], [place] for high blood pressure  He was told that Afrin spray that he was using excessively may have been the cause for high blood pressure  Patient, however never got to recheck his blood pressure until now  Denies any chest pain, shortness of breath, difficulty voiding  Denies using any NSAIDs either  Lab work performed during this admission revealed elevated serum creatinine of 1 8 mg/dL  PAST MEDICAL HISTORY     Past Medical History:   Diagnosis Date    Hypertension     Obese        PAST SURGICAL HISTORY     History reviewed  No pertinent surgical history  ALLERGIES     No Known Allergies    SOCIAL HISTORY     Social History     Substance and Sexual Activity   Alcohol Use Not Currently     Social History     Substance and Sexual Activity   Drug Use Not Currently     Social History     Tobacco Use   Smoking Status Never Smoker   Smokeless Tobacco Never Used       FAMILY HISTORY     History reviewed  No pertinent family history      CURRENT MEDICATIONS       Current Facility-Administered Medications:     acetaminophen (TYLENOL) tablet 650 mg, 650 mg, Oral, Q6H PRN, Violetta Cowing, DO    amLODIPine (NORVASC) tablet 10 mg, 10 mg, Oral, Daily, ABEL Maldonado, 10 mg at 07/07/20 0400    ceFAZolin (ANCEF) IVPB (premix) 2,000 mg 50 mL, 2,000 mg, Intravenous, Q8H, Violetta Cowing, DO, Last Rate: 100 mL/hr at 07/07/20 0859, 2,000 mg at 07/07/20 0859    enoxaparin (LOVENOX) subcutaneous injection 60 mg, 60 mg, Subcutaneous, BID, Violetta Cowing, DO, 60 mg at 07/07/20 0859    furosemide (LASIX) injection 40 mg, 40 mg, Intravenous, BID (diuretic), Violetta Cowing, DO, 40 mg at 07/07/20 0859    hydrALAZINE (APRESOLINE) injection 10 mg, 10 mg, Intravenous, Q6H PRN, Celina Haque MD    REVIEW OF SYSTEMS     Review of Systems   Constitutional: Negative  HENT: Negative  Eyes: Negative  Respiratory: Negative  Cardiovascular: Positive for leg swelling  Gastrointestinal: Negative  Endocrine: Negative  Genitourinary: Negative  Musculoskeletal: Negative  Skin: Negative  Allergic/Immunologic: Negative  Neurological: Negative  Hematological: Negative  All other systems reviewed and are negative  OBJECTIVE     Current Weight: Weight - Scale: (!) 170 kg (375 lb 3 6 oz)  Vitals:    07/07/20 1119   BP: (!) 172/115   Pulse: 82   Resp:    Temp: 98 8 °F (37 1 °C)   SpO2: 96%     Body mass index is 52 33 kg/m²  Intake/Output Summary (Last 24 hours) at 7/7/2020 1143  Last data filed at 7/7/2020 1049  Gross per 24 hour   Intake 120 ml   Output 1300 ml   Net -1180 ml       PHYSICAL EXAMINATION     Physical Exam   Constitutional: He is oriented to person, place, and time  HENT:   Head: Normocephalic and atraumatic  Eyes: Pupils are equal, round, and reactive to light  Neck: Neck supple  No JVD present  Cardiovascular: Normal rate, regular rhythm and normal heart sounds  Exam reveals no friction rub  No murmur heard  Pulmonary/Chest: Effort normal and breath sounds normal    Abdominal: Soft  Bowel sounds are normal  He exhibits no distension  There is no tenderness  There is no rebound  Musculoskeletal: He exhibits edema  He exhibits no tenderness  Neurological: He is alert and oriented to person, place, and time  Skin: Skin is dry  No rash noted  There is erythema  Psychiatric: He has a normal mood and affect  LAB RESULTS        Results from last 7 days   Lab Units 07/07/20  0504 07/06/20  1358   WBC Thousand/uL 8 15 9 03   HEMOGLOBIN g/dL 14 2 15 4   HEMATOCRIT % 46 1 50 4*   PLATELETS Thousands/uL 189 202   POTASSIUM mmol/L 3 9 5 1   CHLORIDE mmol/L 107 107   CO2 mmol/L 27 29   BUN mg/dL 24 24   CREATININE mg/dL 1 82* 1 98*   EGFR ml/min/1 73sq m 42 38   CALCIUM mg/dL 8 2* 8 7   MAGNESIUM mg/dL 2 1  --    PHOSPHORUS mg/dL 3 9  --        I have personally reviewed the old medical records and patient's previously known baseline creatinine level is ~ unknown    RADIOLOGY RESULTS     Results for orders placed during the hospital encounter of 07/06/20   XR chest 1 view portable    Narrative CHEST     INDICATION:   dyspnea  COMPARISON:  None    EXAM PERFORMED/VIEWS:  XR CHEST PORTABLE      FINDINGS:    Cardiomediastinal silhouette appears unremarkable  The lungs are clear  No pneumothorax or pleural effusion  Osseous structures appear within normal limits for patient age  Impression No acute cardiopulmonary disease  Workstation performed: [id number]       No results found for this or any previous visit  No results found for this or any previous visit  No results found for this or any previous visit  No results found for this or any previous visit  No results found for this or any previous visit  PLAN / RECOMMENDATIONS      53-year-old male with past medical history of obesity, transient elevated blood pressure approximately 1 year ago who presents with lower extremity edema and found to have elevated renal parameters      1  Acute kidney injury:  Baseline serum creatinine is unknown   -patient presented with elevated creatinine of 1 8 with estimated GFR 42   -no prior history of abnormal renal parameters as per patient   -potential for probable underlying CKD given patient uncontrolled hypertension     -obtain renal ultrasound  -urinalysis reveals trace protein and trace blood   -obtain urine protein creatinine ratio   -patient was exposed to contrast via CT angiogram yesterday and is therefore at risk for worsening of John  2  Hypertensive urgency:  Blood pressures have been persistently greater than 439 mmHg systolic   -element of volume overload as well contributing to elevated blood pressure and therefore on Lasix 40 mg twice daily  -currently on amlodipine 10 mg once daily   -initiate carvedilol 12 5 mg p o  B i d   -clonidine 0 2 mg b i d     3  Lower extremity edema:  Progressive worsening lower extremity edema along with findings of probable CHF noted   -patient is scheduled for 2D echo  -PE study done did not reveal pulmonary embolism  4  Proteinuria:  Noted to have trace protein urine   -as stated above will quantify his proteinuria  -order paraproteinemia workup  Thank you for the consultation to participate in patient's care  I have personally discussed my plan with the referring physician       Bernadine Maria MD    7/7/2020

## 2020-07-07 NOTE — ASSESSMENT & PLAN NOTE
Wt Readings from Last 3 Encounters:   07/07/20 (!) 170 kg (375 lb 3 6 oz)       Patient on IV lasix 40  BD  Blood pressure today is 182/119 down from admission (237/124)  Xray chest is normal, echo pending  Patient reports an increase in urine output

## 2020-07-07 NOTE — UTILIZATION REVIEW
Initial Clinical Review    Admission: Date/Time/Statement: Admission Orders (From admission, onward)     Ordered        07/06/20 1711  Inpatient Admission  Once                   Orders Placed This Encounter   Procedures    Inpatient Admission     Standing Status:   Standing     Number of Occurrences:   1     Order Specific Question:   Admitting Physician     Answer:   Jluis Gillette [[id number]]     Order Specific Question:   Level of Care     Answer:   Med Surg [[id number]]     Order Specific Question:   Estimated length of stay     Answer:   More than 2 Midnights     Order Specific Question:   Certification     Answer:   I certify that inpatient services are medically necessary for this patient for a duration of greater than two midnights  See H&P and MD Progress Notes for additional information about the patient's course of treatment  ED Arrival Information     Expected Arrival Acuity Means of Arrival Escorted By Service Admission Type    - 7/6/2020 12:55 Urgent Walk-In Family Member General Medicine Urgent    Arrival Complaint    Leg swelling        Chief Complaint   Patient presents with    Leg Swelling     RLE swelling x1 month, new onset cough and intermittent fevers  report recent covid negative  Assessment/Plan: 45 yo male to ED from home w/ LE swelling worsening over the past month   + LE pruritis and has been scratching   + CAMPBELL   Using albuterol w/ o relied  + polyuria  Has not seen his PCP for a yr   Admitted IP status w/ acute hrt failure plan to check echo , IV lasix , daily weight , I&O , cardiology consult  + cellulitis LLE IV ancef , f/u BC and am pct   Elevated creatine ECC 69 9 , will get renal consult in setting of volume overload, watch cr w/ lasix and getting dye load for CTA   HTN IV labetalol to get SBP < 190   PE : edema  +2-3 pitting LE     7/7 0300 Quick note   HTN urgency labetalol given x2 IV , cont to have systolic BP > 764   Cardene gtt started along w/ po clonidine       7/7 8582 Quick note Critical Care   SBP on arrival was 237, would receommend approx 20% reduction within 24 hours with goal SBP between 180-200  Would give additional dose of lasix now  Would start amlodipine for HTN, will add PRN hydralazine  Could also consider adding clonidine  Agree with echo  Will also check repeat EKG  Will check additional troponin  Cardiology and nephrology consult pending  Monitor renal indices adn I/O and daily weights closely in light of SUMI versus CKD    ED Triage Vitals   Temperature Pulse Respirations Blood Pressure SpO2   07/06/20 1307 07/06/20 1307 07/06/20 1307 07/06/20 1308 07/06/20 1307   98 °F (36 7 °C) 98 (!) 24 (!) 237/121 94 %      Temp src Heart Rate Source Patient Position - Orthostatic VS BP Location FiO2 (%)   -- 07/06/20 1354 07/06/20 1354 07/06/20 1354 --    Monitor Lying Left arm       Pain Score       07/06/20 1307       No Pain        Wt Readings from Last 1 Encounters:   07/07/20 (!) 170 kg (375 lb 3 6 oz)     Additional Vital Signs:   07/07/20 07:57:45  98 6 °F (37 °C)  72  17  174/106Abnormal   129  90 %       07/07/20 0420    79    182/119Abnormal    140  95 %       BP: PO Norvasc given as per MD orders at 07/07/20 0420   07/07/20 0331    83    180/119Abnormal   139  95 %       07/07/20 0255              None (Room air)     07/07/20 0240        218/105Abnormal          Lying   BP: Manual BP at 07/07/20 0240   07/07/20 0239      20          Lying   07/07/20 0235    80    187/120Abnormal    142  94 %  None (Room air)  Lying   BP: Automatic BP at 07/07/20 0235   07/07/20 0204    79    220/100Abnormal      92 %  None (Room air)  Lying   BP: OT dose Labetalol given  MD aware   Manual BP at 07/07/20 0204   07/07/20 0139    75  20  192/126Abnormal    148  94 %  None (Room air)  Lying   BP: rechecked at 07/07/20 0139   07/06/20 23:23:27  99 3 °F (37 4 °C)  88    196/128Abnormal    151  95 %       BP: prn labetalol given  MD aware   at 07/06/20 2323   07/06/20 21:48:33  98 6 °F (37 °C)  89  20  204/135Abnormal   158  96 %    Lying   07/06/20 2144      20        None (Room air)  Lying   07/06/20 1915    86  16  175/108Abnormal     93 %       07/06/20 1830    84  19  202/123Abnormal   154  94 %       07/06/20 1800    89  23Abnormal   196/124Abnormal   154  95 %       07/06/20 1745    84  18  198/117Abnormal   151  95 %       07/06/20 1730    89  18  199/123Abnormal   154  96 %       07/06/20 1700    87  29Abnormal   200/120Abnormal   155  94 %       07/06/20 1445    92  30Abnormal   213/103Abnormal   148  96 %  None (Room air)  Lying   07/06/20 1430    92  26Abnormal   215/104Abnormal   148  94 %  None (Room air)  Lying   07/06/20 1400    100  29Abnormal   220/123Abnormal   164  95 %  None (Room air)  Lying   07/06/20 1354    99  26Abnormal   221/134Abnormal     92 %  None (Room air)  Lying   07/06/20 1346              None (Room air)     07/06/20 1308        237/121Abnormal             BP: taken x2 at 07/06/20 1308       Pertinent Labs/Diagnostic Test Results:   7/6 CTA chest   No evidence for pulmonary embolism        Mild pulmonary vascular congestion        Small pericardial effusion   7/6 PCXR No acute cardiopulmonary disease  7/6 EKG - NSR   Results from last 7 days   Lab Units 07/06/20  1358   SARS-COV-2  Negative     Results from last 7 days   Lab Units 07/07/20  0504 07/06/20  1358   WBC Thousand/uL 8 15 9 03   HEMOGLOBIN g/dL 14 2 15 4   HEMATOCRIT % 46 1 50 4*   PLATELETS Thousands/uL 189 202   NEUTROS ABS Thousands/µL  --  6 80     Results from last 7 days   Lab Units 07/07/20  0504 07/06/20  1358   SODIUM mmol/L 141 141   POTASSIUM mmol/L 3 9 5 1   CHLORIDE mmol/L 107 107   CO2 mmol/L 27 29   ANION GAP mmol/L 7 5   BUN mg/dL 24 24   CREATININE mg/dL 1 82* 1 98*   EGFR ml/min/1 73sq m 42 38   CALCIUM mg/dL 8 2* 8 7   MAGNESIUM mg/dL 2 1  --    PHOSPHORUS mg/dL 3 9  --      Results from last 7 days   Lab Units 07/06/20  1358   AST U/L 37   ALT U/L 31   ALK PHOS U/L 112   TOTAL PROTEIN g/dL 7 8   ALBUMIN g/dL 3 4*   TOTAL BILIRUBIN mg/dL 0 70     Results from last 7 days   Lab Units 07/07/20  0504 07/06/20  1358   GLUCOSE RANDOM mg/dL 101 92     Results from last 7 days   Lab Units 07/07/20  0504 07/06/20  1358   TROPONIN I ng/mL 0 05* 0 04     Results from last 7 days   Lab Units 07/06/20  1358   D-DIMER QUANTITATIVE ug/ml FEU 2 02*     Results from last 7 days   Lab Units 07/06/20  1358   PROTIME seconds 14 4   INR  1 10   PTT seconds 35     Results from last 7 days   Lab Units 07/06/20  1358   TSH 3RD GENERATON uIU/mL 1 651     Results from last 7 days   Lab Units 07/07/20  0504   PROCALCITONIN ng/ml <0 05     Results from last 7 days   Lab Units 07/06/20  1555   LACTIC ACID mmol/L 0 7     Results from last 7 days   Lab Units 07/06/20  1358   NT-PRO BNP pg/mL 1,630*     Results from last 7 days   Lab Units 07/07/20  0352   CLARITY UA  Clear   COLOR UA  Yellow   SPEC GRAV UA  1 015   PH UA  6 0   GLUCOSE UA mg/dl Negative   KETONES UA mg/dl Negative   BLOOD UA  Trace-lysed*   PROTEIN UA mg/dl Trace*   NITRITE UA  Negative   BILIRUBIN UA  Negative   UROBILINOGEN UA E U /dl 0 2   LEUKOCYTES UA  Negative   WBC UA /hpf None Seen   RBC UA /hpf 0-1*   BACTERIA UA /hpf None Seen   EPITHELIAL CELLS WET PREP /hpf Occasional     Results from last 7 days   Lab Units 07/06/20  1555 07/06/20  1540   BLOOD CULTURE  Received in Microbiology Lab  Culture in Progress  Received in Microbiology Lab  Culture in Progress       ED Treatment:   Medication Administration from 07/06/2020 1255 to 07/06/2020 2138       Date/Time Order Dose Route Action     07/06/2020 1556 sodium chloride 0 9 % bolus 1,000 mL 1,000 mL Intravenous New Bag     07/06/2020 1540 ceftriaxone (ROCEPHIN) 1 g/50 mL in dextrose IVPB 1,000 mg Intravenous New Bag     07/06/2020 1557 Labetalol HCl (NORMODYNE) injection 10 mg 10 mg Intravenous Given 07/06/2020 1729 Labetalol HCl (NORMODYNE) injection 20 mg 20 mg Intravenous Given     07/06/2020 1734 furosemide (LASIX) injection 40 mg 40 mg Intravenous Given     07/06/2020 1925 enoxaparin (LOVENOX) subcutaneous injection 60 mg 60 mg Subcutaneous Given        Past Medical History:   Diagnosis Date    Hypertension     Obese      Present on Admission:  **None**      Admitting Diagnosis: Hypertension [I10]  Leg swelling [M79 89]  Elevated serum creatinine [R79 89]  Congestive heart failure (CHF) (HCC) [I50 9]  Cellulitis of right lower extremity [L03 115]  Bilateral lower extremity edema [R60 0]  Age/Sex: 44 y o  male  Admission Orders:  Scheduled Medications:    Medications:  amLODIPine 10 mg Oral Daily   cefazolin 2,000 mg Intravenous Q8H   enoxaparin 60 mg Subcutaneous BID   furosemide 40 mg Intravenous BID (diuretic)     Continuous IV Infusions:     PRN Meds:    acetaminophen 650 mg Oral Q6H PRN   hydrALAZINE 10 mg Intravenous Q6H PRN     Bladder scan   Daily weight   I&O   2000 ml fluid restriction   EKG prn cp   Up as kayce   Tele   IP CONSULT TO NUTRITION SERVICES  IP CONSULT TO CARDIOLOGY  IP CONSULT TO NEPHROLOGY  IP CONSULT TO CASE MANAGEMENT    Network Utilization Review Department  [email or web address]  ATTENTION: Please call with any questions or concerns to [phone] and carefully listen to the prompts so that you are directed to the right person  All voicemails are confidential   Laquita Persaud all requests for admission clinical reviews, approved or denied determinations and any other requests to dedicated fax number below belonging to the campus where the patient is receiving treatment   List of dedicated fax numbers for the Facilities:  [address] DENIALS (Administrative/Medical Necessity) [phone]   [facility] [phone]   Karolyn Ang [phone]   Jasmyn Pleasant [phone]     Leanne Dennis [phone]   [facility] [address] [facility] [phone]   [facility]  [phone]   220 [facility]  [phone] 412 [facility] 1000 [facility] [phone]

## 2020-07-07 NOTE — PROGRESS NOTES
Patient's SBP remain high 190's-200 despite prn Labetalol being given  Patient asymptomatic at this time  Tello Liz made aware  Will continue to monitor

## 2020-07-08 ENCOUNTER — APPOINTMENT (INPATIENT)
Dept: NON INVASIVE DIAGNOSTICS | Facility: HOSPITAL | Age: 50
DRG: 192 | End: 2020-07-08
Payer: COMMERCIAL

## 2020-07-08 PROBLEM — E78.5 HYPERLIPIDEMIA: Status: ACTIVE | Noted: 2020-07-08

## 2020-07-08 LAB
ANION GAP SERPL CALCULATED.3IONS-SCNC: 7 MMOL/L (ref 4–13)
ATRIAL RATE: 79 BPM
ATRIAL RATE: 97 BPM
BASOPHILS # BLD AUTO: 0.03 THOUSANDS/ΜL (ref 0–0.1)
BASOPHILS NFR BLD AUTO: 0 % (ref 0–1)
BUN SERPL-MCNC: 24 MG/DL (ref 5–25)
CALCIUM SERPL-MCNC: 8.9 MG/DL (ref 8.3–10.1)
CHLORIDE SERPL-SCNC: 105 MMOL/L (ref 100–108)
CHOLEST SERPL-MCNC: 209 MG/DL (ref 50–200)
CO2 SERPL-SCNC: 31 MMOL/L (ref 21–32)
CREAT SERPL-MCNC: 2.03 MG/DL (ref 0.6–1.3)
EOSINOPHIL # BLD AUTO: 0.08 THOUSAND/ΜL (ref 0–0.61)
EOSINOPHIL NFR BLD AUTO: 1 % (ref 0–6)
ERYTHROCYTE [DISTWIDTH] IN BLOOD BY AUTOMATED COUNT: 15.5 % (ref 11.6–15.1)
GFR SERPL CREATININE-BSD FRML MDRD: 37 ML/MIN/1.73SQ M
GLUCOSE SERPL-MCNC: 100 MG/DL (ref 65–140)
HCT VFR BLD AUTO: 48.4 % (ref 36.5–49.3)
HDLC SERPL-MCNC: 33 MG/DL
HGB BLD-MCNC: 15.1 G/DL (ref 12–17)
IMM GRANULOCYTES # BLD AUTO: 0.04 THOUSAND/UL (ref 0–0.2)
IMM GRANULOCYTES NFR BLD AUTO: 1 % (ref 0–2)
LDLC SERPL CALC-MCNC: 150 MG/DL (ref 0–100)
LYMPHOCYTES # BLD AUTO: 0.96 THOUSANDS/ΜL (ref 0.6–4.47)
LYMPHOCYTES NFR BLD AUTO: 11 % (ref 14–44)
MCH RBC QN AUTO: 26.7 PG (ref 26.8–34.3)
MCHC RBC AUTO-ENTMCNC: 31.2 G/DL (ref 31.4–37.4)
MCV RBC AUTO: 86 FL (ref 82–98)
MONOCYTES # BLD AUTO: 0.73 THOUSAND/ΜL (ref 0.17–1.22)
MONOCYTES NFR BLD AUTO: 8 % (ref 4–12)
NEUTROPHILS # BLD AUTO: 6.88 THOUSANDS/ΜL (ref 1.85–7.62)
NEUTS SEG NFR BLD AUTO: 79 % (ref 43–75)
NONHDLC SERPL-MCNC: 176 MG/DL
NRBC BLD AUTO-RTO: 0 /100 WBCS
P AXIS: 5 DEGREES
P AXIS: 50 DEGREES
PLATELET # BLD AUTO: 200 THOUSANDS/UL (ref 149–390)
PMV BLD AUTO: 10.5 FL (ref 8.9–12.7)
POTASSIUM SERPL-SCNC: 3.9 MMOL/L (ref 3.5–5.3)
PR INTERVAL: 154 MS
PR INTERVAL: 170 MS
QRS AXIS: 26 DEGREES
QRS AXIS: 3 DEGREES
QRSD INTERVAL: 80 MS
QRSD INTERVAL: 84 MS
QT INTERVAL: 368 MS
QT INTERVAL: 434 MS
QTC INTERVAL: 467 MS
QTC INTERVAL: 497 MS
RBC # BLD AUTO: 5.65 MILLION/UL (ref 3.88–5.62)
SODIUM SERPL-SCNC: 143 MMOL/L (ref 136–145)
T WAVE AXIS: 64 DEGREES
T WAVE AXIS: 89 DEGREES
TRIGL SERPL-MCNC: 129 MG/DL
TROPONIN I SERPL-MCNC: 0.04 NG/ML
VENTRICULAR RATE: 79 BPM
VENTRICULAR RATE: 97 BPM
WBC # BLD AUTO: 8.72 THOUSAND/UL (ref 4.31–10.16)

## 2020-07-08 PROCEDURE — 85025 COMPLETE CBC W/AUTO DIFF WBC: CPT | Performed by: INTERNAL MEDICINE

## 2020-07-08 PROCEDURE — 99232 SBSQ HOSP IP/OBS MODERATE 35: CPT | Performed by: INTERNAL MEDICINE

## 2020-07-08 PROCEDURE — 99233 SBSQ HOSP IP/OBS HIGH 50: CPT | Performed by: INTERNAL MEDICINE

## 2020-07-08 PROCEDURE — 80048 BASIC METABOLIC PNL TOTAL CA: CPT | Performed by: INTERNAL MEDICINE

## 2020-07-08 PROCEDURE — 94762 N-INVAS EAR/PLS OXIMTRY CONT: CPT

## 2020-07-08 PROCEDURE — 93010 ELECTROCARDIOGRAM REPORT: CPT | Performed by: INTERNAL MEDICINE

## 2020-07-08 PROCEDURE — 84484 ASSAY OF TROPONIN QUANT: CPT | Performed by: INTERNAL MEDICINE

## 2020-07-08 PROCEDURE — 93306 TTE W/DOPPLER COMPLETE: CPT

## 2020-07-08 PROCEDURE — 93306 TTE W/DOPPLER COMPLETE: CPT | Performed by: INTERNAL MEDICINE

## 2020-07-08 PROCEDURE — 80061 LIPID PANEL: CPT | Performed by: INTERNAL MEDICINE

## 2020-07-08 RX ORDER — ATORVASTATIN CALCIUM 40 MG/1
40 TABLET, FILM COATED ORAL
Status: DISCONTINUED | OUTPATIENT
Start: 2020-07-08 | End: 2020-07-11 | Stop reason: HOSPADM

## 2020-07-08 RX ORDER — TORSEMIDE 20 MG/1
40 TABLET ORAL DAILY
Status: DISCONTINUED | OUTPATIENT
Start: 2020-07-09 | End: 2020-07-09

## 2020-07-08 RX ORDER — ATORVASTATIN CALCIUM 40 MG/1
40 TABLET, FILM COATED ORAL
Status: DISCONTINUED | OUTPATIENT
Start: 2020-07-08 | End: 2020-07-08

## 2020-07-08 RX ORDER — ISOSORBIDE DINITRATE 10 MG/1
10 TABLET ORAL
Status: DISCONTINUED | OUTPATIENT
Start: 2020-07-08 | End: 2020-07-11

## 2020-07-08 RX ORDER — HYDRALAZINE HYDROCHLORIDE 25 MG/1
25 TABLET, FILM COATED ORAL EVERY 8 HOURS SCHEDULED
Status: DISCONTINUED | OUTPATIENT
Start: 2020-07-08 | End: 2020-07-11

## 2020-07-08 RX ADMIN — ISOSORBIDE DINITRATE 10 MG: 10 TABLET ORAL at 17:27

## 2020-07-08 RX ADMIN — AMLODIPINE BESYLATE 10 MG: 10 TABLET ORAL at 08:05

## 2020-07-08 RX ADMIN — CEFAZOLIN SODIUM 2000 MG: 2 SOLUTION INTRAVENOUS at 15:56

## 2020-07-08 RX ADMIN — HYDRALAZINE HYDROCHLORIDE 25 MG: 25 TABLET ORAL at 14:41

## 2020-07-08 RX ADMIN — CARVEDILOL 12.5 MG: 12.5 TABLET, FILM COATED ORAL at 15:55

## 2020-07-08 RX ADMIN — CEFAZOLIN SODIUM 2000 MG: 2 SOLUTION INTRAVENOUS at 08:05

## 2020-07-08 RX ADMIN — CLONIDINE HYDROCHLORIDE 0.2 MG: 0.1 TABLET ORAL at 08:04

## 2020-07-08 RX ADMIN — HYDRALAZINE HYDROCHLORIDE 25 MG: 25 TABLET ORAL at 22:54

## 2020-07-08 RX ADMIN — PERFLUTREN 0.6 ML/MIN: 6.52 INJECTION, SUSPENSION INTRAVENOUS at 14:50

## 2020-07-08 RX ADMIN — CEFAZOLIN SODIUM 2000 MG: 2 SOLUTION INTRAVENOUS at 01:00

## 2020-07-08 RX ADMIN — ATORVASTATIN CALCIUM 40 MG: 40 TABLET, FILM COATED ORAL at 15:55

## 2020-07-08 RX ADMIN — CARVEDILOL 12.5 MG: 12.5 TABLET, FILM COATED ORAL at 08:04

## 2020-07-08 RX ADMIN — ENOXAPARIN SODIUM 60 MG: 60 INJECTION SUBCUTANEOUS at 08:05

## 2020-07-08 RX ADMIN — FUROSEMIDE 80 MG: 10 INJECTION, SOLUTION INTRAMUSCULAR; INTRAVENOUS at 08:04

## 2020-07-08 RX ADMIN — ENOXAPARIN SODIUM 60 MG: 60 INJECTION SUBCUTANEOUS at 17:29

## 2020-07-08 NOTE — PLAN OF CARE
Problem: Potential for Falls  Goal: Patient will remain free of falls  Description  INTERVENTIONS:  - Assess patient frequently for physical needs  -  Identify cognitive and physical deficits and behaviors that affect risk of falls    -  [place] fall precautions as indicated by assessment   - Educate patient/family on patient safety including physical limitations  - Instruct patient to call for assistance with activity based on assessment  - Modify environment to reduce risk of injury  - Consider OT/PT consult to assist with strengthening/mobility  Outcome: Progressing     Problem: PAIN - ADULT  Goal: Verbalizes/displays adequate comfort level or baseline comfort level  Description  Interventions:  - Encourage patient to monitor pain and request assistance  - Assess pain using appropriate pain scale  - Administer analgesics based on type and severity of pain and evaluate response  - Implement non-pharmacological measures as appropriate and evaluate response  - Consider cultural and social influences on pain and pain management  - Notify physician/advanced practitioner if interventions unsuccessful or patient reports new pain  Outcome: Progressing     Problem: INFECTION - ADULT  Goal: Absence or prevention of progression during hospitalization  Description  INTERVENTIONS:  - Assess and monitor for signs and symptoms of infection  - Monitor lab/diagnostic results  - Monitor all insertion sites, i e  indwelling lines, tubes, and drains  - Monitor endotracheal if appropriate and nasal secretions for changes in amount and color  - [place] appropriate cooling/warming therapies per order  - Administer medications as ordered  - Instruct and encourage patient and family to use good hand hygiene technique  - Identify and instruct in appropriate isolation precautions for identified infection/condition  Outcome: Progressing  Goal: Absence of fever/infection during neutropenic period  Description  INTERVENTIONS:  - Monitor WBC    Outcome: Progressing     Problem: SAFETY ADULT  Goal: Maintain or return to baseline ADL function  Description  INTERVENTIONS:  -  Assess patient's ability to carry out ADLs; assess patient's baseline for ADL function and identify physical deficits which impact ability to perform ADLs (bathing, care of mouth/teeth, toileting, grooming, dressing, etc )  - Assess/evaluate cause of self-care deficits   - Assess range of motion  - Assess patient's mobility; develop plan if impaired  - Assess patient's need for assistive devices and provide as appropriate  - Encourage maximum independence but intervene and supervise when necessary  - Involve family in performance of ADLs  - Assess for home care needs following discharge   - Consider OT consult to assist with ADL evaluation and planning for discharge  - Provide patient education as appropriate  Outcome: Progressing  Goal: Maintain or return mobility status to optimal level  Description  INTERVENTIONS:  - Assess patient's baseline mobility status (ambulation, transfers, stairs, etc )    - Identify cognitive and physical deficits and behaviors that affect mobility  - Identify mobility aids required to assist with transfers and/or ambulation (gait belt, sit-to-stand, lift, walker, cane, etc )  - [place] fall precautions as indicated by assessment  - Record patient progress and toleration of activity level on Mobility SBAR; progress patient to next Phase/Stage  - Instruct patient to call for assistance with activity based on assessment  - Consider rehabilitation consult to assist with strengthening/weightbearing, etc   Outcome: Progressing     Problem: DISCHARGE PLANNING  Goal: Discharge to home or other facility with appropriate resources  Description  INTERVENTIONS:  - Identify barriers to discharge w/patient and caregiver  - Arrange for needed discharge resources and transportation as appropriate  - Identify discharge learning needs (meds, wound care, etc )  - Arrange for interpretive services to assist at discharge as needed  - Refer to Case Management Department for coordinating discharge planning if the patient needs post-hospital services based on physician/advanced practitioner order or complex needs related to functional status, cognitive ability, or social support system  Outcome: Progressing     Problem: Knowledge Deficit  Goal: Patient/family/caregiver demonstrates understanding of disease process, treatment plan, medications, and discharge instructions  Description  Complete learning assessment and assess knowledge base    Interventions:  - Provide teaching at level of understanding  - Provide teaching via preferred learning methods  Outcome: Progressing

## 2020-07-08 NOTE — PROGRESS NOTES
NEPHROLOGY PROGRESS NOTE    Patient: Benjie Landa               Sex: male          DOA: 7/6/2020  1:37 PM   YOB: 1970        Age:  44 y o         LOS:  LOS: 2 days   7/8/2020    REASON FOR THE CONSULTATION:      Acute kidney injury    SUBJECTIVE     Patient is sitting up in bed and feels better than yesterday  CURRENT MEDICATIONS       Current Facility-Administered Medications:     acetaminophen (TYLENOL) tablet 650 mg, 650 mg, Oral, Q6H PRN, Sarahi Riley DO    amLODIPine (NORVASC) tablet 10 mg, 10 mg, Oral, Daily, ABEL Maldonado, 10 mg at 07/08/20 0805    atorvastatin (LIPITOR) tablet 40 mg, 40 mg, Oral, Daily With Dinner, Lazarus Rollins MD    carvedilol (COREG) tablet 12 5 mg, 12 5 mg, Oral, BID With Meals, Jay Tesfaye MD, 12 5 mg at 07/08/20 0804    ceFAZolin (ANCEF) IVPB (premix) 2,000 mg 50 mL, 2,000 mg, Intravenous, Q8H, Sarahi Riley DO, Last Rate: 100 mL/hr at 07/08/20 0805, 2,000 mg at 07/08/20 0805    cloNIDine (CATAPRES) tablet 0 2 mg, 0 2 mg, Oral, Q12H [address], Jay Tesfaye MD, 0 2 mg at 07/08/20 0804    enoxaparin (LOVENOX) subcutaneous injection 60 mg, 60 mg, Subcutaneous, BID, Sarahi Riley DO, 60 mg at 07/08/20 0805    furosemide (LASIX) injection 80 mg, 80 mg, Intravenous, BID (diuretic), Florinda Christianson MD, 80 mg at 07/08/20 0804    hydrALAZINE (APRESOLINE) injection 10 mg, 10 mg, Intravenous, Q6H PRN, Rodrigo Keita MD, 10 mg at 07/07/20 2329    melatonin tablet 9 mg, 9 mg, Oral, HS PRN, Hanane Gunderson MD    REVIEW OF SYSTEMS     Review of Systems   Constitutional: Negative  HENT: Negative  Eyes: Negative  Respiratory: Negative  Cardiovascular: Positive for leg swelling  Gastrointestinal: Negative  Endocrine: Negative  Genitourinary: Negative  Musculoskeletal: Negative  Skin: Negative  Allergic/Immunologic: Negative  Neurological: Negative  Hematological: Negative      All other systems reviewed and are negative  OBJECTIVE     Current Weight: Weight - Scale: (!) 167 kg (367 lb 4 6 oz)  Vitals:    07/08/20 1210   BP:    Pulse:    Resp:    Temp:    SpO2: 91%     Body mass index is 51 23 kg/m²  Intake/Output Summary (Last 24 hours) at 7/8/2020 1244  Last data filed at 7/8/2020 1058  Gross per 24 hour   Intake 1520 ml   Output 2270 ml   Net -750 ml       PHYSICAL EXAMINATION     Physical Exam   Constitutional: He is oriented to person, place, and time  HENT:   Head: Normocephalic and atraumatic  Eyes: Pupils are equal, round, and reactive to light  Neck: Neck supple  No JVD present  Cardiovascular: Normal rate, regular rhythm and normal heart sounds  Exam reveals no friction rub  No murmur heard  Pulmonary/Chest: Effort normal and breath sounds normal    Abdominal: Soft  Bowel sounds are normal  He exhibits no distension  There is no tenderness  There is no rebound  Musculoskeletal: He exhibits edema  He exhibits no tenderness  Neurological: He is alert and oriented to person, place, and time  Skin: Skin is dry  No rash noted  Psychiatric: He has a normal mood and affect  LAB RESULTS     Results from last 7 days   Lab Units 07/08/20 07/07/20  0504 07/06/20  1358   WBC Thousand/uL 8 72 8 15 9 03   HEMOGLOBIN g/dL 15 1 14 2 15 4   HEMATOCRIT % 48 4 46 1 50 4*   PLATELETS Thousands/uL 200 189 202   POTASSIUM mmol/L 3 9 3 9 5 1   CHLORIDE mmol/L 105 107 107   CO2 mmol/L 31 27 29   BUN mg/dL 24 24 24   CREATININE mg/dL 2 03* 1 82* 1 98*   EGFR ml/min/1 73sq m 37 42 38   CALCIUM mg/dL 8 9 8 2* 8 7   MAGNESIUM mg/dL  --  2 1  --    PHOSPHORUS mg/dL  --  3 9  --            RADIOLOGY RESULTS      Results for orders placed during the hospital encounter of 07/06/20   XR chest 1 view portable    Narrative CHEST     INDICATION:   dyspnea  COMPARISON:  None    EXAM PERFORMED/VIEWS:  XR CHEST PORTABLE      FINDINGS:    Cardiomediastinal silhouette appears unremarkable  The lungs are clear    No pneumothorax or pleural effusion  Osseous structures appear within normal limits for patient age  Impression No acute cardiopulmonary disease  Workstation performed: [id number]       No results found for this or any previous visit  ASSESSMENT/PLAN     78-year-old male with past medical history of obesity, remote history of elevated blood pressure who presents with lower extremity edema and found to have acute kidney injury  1  Acute kidney injury:  Baseline serum creatinine is unknown but suspect underlying CKD secondary to hypertension not diagnosed  -presented with a serum creatinine 1 9 which is currently at 2 0 mg/dL after diuresis achieved  -renal ultrasound is unremarkable     -urinalysis with trace protein  2  Hypertension:  Uncontrolled upon presentation  Initiated on carvedilol, clonidine and amlodipine with improved blood pressures  3  Secondary hyperparathyroidism of renal origin:  Elevated PTH intact and low 25 hydroxy vitamin-D  Will initiate patient on ergocalciferol 49990 units 1 tablet Q 7 days  4  Lower extremity edema:  Elevated BNP and CHF with being ruled out  Diuresed well with IV Lasix  -may switch to p o  Lasix 40 mg b i d  in a m  Case discussed with isacc Bowen MD  Nephrology  7/8/2020

## 2020-07-08 NOTE — SOCIAL WORK
CM name and role reviewed  Discharge Checklist reviewed and CM will continue to monitor for progress toward discharge goals in nursing and provider rounds  CM met with pt at bedside  Pt alert  Pt reported that he lives with family  He said that he is ADL independent  No dme needed  Pt does not have health insurance  He said that he will apply for MA  Pt reported that he has not yet been referred to [facility]  CM informed him that CM will send this to financial counselor so that the referral can be made to [facility]  Pt agreeable to this  CM discussed follow up care of hospitalization  CM provided him with [facility] list, clinics list, [facility] and [facility] list  Pt said that he will us [facility] in [facility]  No hx of VNA or SNF  Pt drives  He said that his mother will transport him home upon discharge  CM reviewed discharge planning process including the following: identifying help at home, patient preference for discharge planning needs, pharmacy preference, and availability of treatment team to discuss questions or concerns patient and/or family may have regarding understanding medications and recognizing signs and symptoms once discharged  CM also encouraged patient to follow up with all recommended appointments after discharge  Patient advised of importance for patient and family to participate in managing patients medical well being

## 2020-07-08 NOTE — PROGRESS NOTES
Progress Note - Rima Gonzalez 1970, 44 y o  male MRN: [id number]    Unit/Bed#: -01 Encounter: [id number]    Primary Care Provider: Maribeth Beach PA-C   Date and time admitted to hospital: 7/6/2020  1:37 PM        Hyperlipidemia  Assessment & Plan  The 10-year ASCVD risk score (Renuka Szymanski et al , 2013) is: 8 6%    Values used to calculate the score:      Age: 44 years      Sex: Male      Is Non- : No      Diabetic: No      Tobacco smoker: No      Systolic Blood Pressure: 543 mmHg      Is BP treated: Yes      HDL Cholesterol: 33 mg/dL      Total Cholesterol: 209 mg/dL     Patients lipid profile shows elevated cholesterol  Placed on Lipitor 40 mg, OD  Insomnia  Assessment & Plan  Patient reports insomnia  Currently being given melatonin 9mg, PRN  Morbid obesity due to excess calories ([address] )  Assessment & Plan  RD consult, A1C: 5 7  TSH normal    Cellulitis of left lower extremity  Assessment & Plan  Patient inititially presented with erythema and tenderness on the R  Leg  He reports that he had blisters on the area that he scratched and ruptured  He reports pain and swelling have gone down since admit  He received rocephin in ER  He is currently receiving cefazolin 2g three times a day, first dose 7/7/20  Antibiotics may be d/c tomorrow based upon pending blood culture report  Afebrile today, WBC levels normal today  Polyuria  Assessment & Plan  A1C 5 4  Patient currently on IV lasix 80 mg BD  Elevated serum creatinine  Assessment & Plan  Patients creatnine increased from 1 82 to 2 03  Ronnie Pantoja Patient's IV Lasix increased to 80 mg IV BD, per cardio recommendation  Possible CKD given context of uncontrolled HTN  Patient under went US kidney and bladder, per nephro rec  Normal US results  Hypertensive urgency  Assessment & Plan  Patients BP today is 148/88, decreased from admission (231/128)   He is currently on   Lasix IV 80 BD, per cardiology recommendation  Amlodipine 10 mg OD,   Coreg 12 5 bid  clonidine 0 2 bid  and hydralazine PRN for sbp >160  Patient recommended to schedule an outpatient sleep study for JOHN  * Acute heart failure [facility])  Assessment & Plan  Wt Readings from Last 3 Encounters:   20 (!) 167 kg (367 lb 4 6 oz)       Patients IV lasix 40 BD increased to IV lasix 80 mg BD, per cardiology recommendations  Amlodipine 10 mg od, Coreg 12 5 mg BD, clonidine   2mg BD  Blood pressure today is 148/88, down from admission (237/124)  Patient net -700 as of 20, 11:00 am  20 net -590  Xray chest is normal, echo pending  VTE Pharmacologic Prophylaxis:   Pharmacologic: Enoxaparin (Lovenox)  Mechanical VTE Prophylaxis in Place: Yes    Discussions with Specialists or Other Care Team Provider: nephrology, cardiology    Education and Discussions with Family / Patient: discussed at bedside    Current Length of Stay: 2 day(s)    Current Patient Status: Inpatient     Discharge Plan / Estimated Discharge Date: pending  Code Status: Level 1 - Full Code      Subjective:   Patient reports that he is doing well  He has a decrease in pedal edema since the time of admission  He reports that his SOB has significantly decreased, but that he has nasal congestion with 1 ep of productive cough  He denies any headache, blurriness in vision and chest pain  Objective:     Vitals:   Temp (24hrs), Av 1 °F (36 7 °C), Min:97 8 °F (36 6 °C), Max:98 5 °F (36 9 °C)    Temp:  [97 8 °F (36 6 °C)-98 5 °F (36 9 °C)] 98 °F (36 7 °C)  HR:  [65-80] 70  Resp:  [17-20] 19  BP: (142-181)/() 142/86  SpO2:  [91 %-97 %] 94 %  Body mass index is 51 23 kg/m²  Input and Output Summary (last 24 hours):        Intake/Output Summary (Last 24 hours) at 2020 1122  Last data filed at 2020 1058  Gross per 24 hour   Intake 1520 ml   Output 2270 ml   Net -750 ml       Physical Exam:     Physical Exam   Constitutional: He is oriented to person, place, and time  He appears well-developed and well-nourished  HENT:   Head: Normocephalic and atraumatic  Eyes: EOM are normal    Neck: Normal range of motion  Cardiovascular: Normal rate and regular rhythm  Pulmonary/Chest: Effort normal  No respiratory distress  Musculoskeletal:        Right ankle: He exhibits decreased range of motion and swelling  Tenderness  There is improvement from previous physical exam    Neurological: He is alert and oriented to person, place, and time  Psychiatric: He has a normal mood and affect  His behavior is normal            Additional Data:     Labs:    Results from last 7 days   Lab Units 07/08/20   WBC Thousand/uL 8 72   HEMOGLOBIN g/dL 15 1   HEMATOCRIT % 48 4   PLATELETS Thousands/uL 200   NEUTROS PCT % 79*   LYMPHS PCT % 11*   MONOS PCT % 8   EOS PCT % 1     Results from last 7 days   Lab Units 07/08/20 07/06/20  1358   POTASSIUM mmol/L 3 9   < > 5 1   CHLORIDE mmol/L 105   < > 107   CO2 mmol/L 31   < > 29   BUN mg/dL 24   < > 24   CREATININE mg/dL 2 03*   < > 1 98*   CALCIUM mg/dL 8 9   < > 8 7   ALK PHOS U/L  --   --  112   ALT U/L  --   --  31   AST U/L  --   --  37    < > = values in this interval not displayed  Results from last 7 days   Lab Units 07/06/20  1358   INR  1 10       * I Have Reviewed All Lab Data Listed Above  * Additional Pertinent Lab Tests Reviewed: All Labs Within Last 24 Hours Reviewed    Imaging:    Imaging Reports Reviewed Today Include:US kidney and bladders  Imaging Personally Reviewed by Myself Includes:  US kidney and bladders    Recent Cultures (last 7 days):     Results from last 7 days   Lab Units 07/06/20  1555 07/06/20  1540   BLOOD CULTURE  No Growth at 24 hrs  No Growth at 24 hrs         Last 24 Hours Medication List:     Current Facility-Administered Medications:  acetaminophen 650 mg Oral Q6H PRN Miguel Diss, DO    amLODIPine 10 mg Oral Daily ABEL Maldonado    atorvastatin 40 mg Oral Daily With TXU Aide, MD    carvedilol 12 5 mg Oral BID With Meals Justine Frankel MD    cefazolin 2,000 mg Intravenous Q8H Lusi Fernando Hemp, DO Last Rate: 2,000 mg (07/08/20 0805)   cloNIDine 0 2 mg Oral Q12H [address] Justine Frankel MD    enoxaparin 60 mg Subcutaneous BID Luis Fernando Hemp, DO    furosemide 80 mg Intravenous BID (diuretic) Shannon Montalvo MD    hydrALAZINE 10 mg Intravenous Q6H PRN Babak Ruiz MD    melatonin 9 mg Oral HS PRN Елена Sanchez MD         Today, Patient Was Seen By: Елена Sanchez MD    ** Please Note: This note has been constructed using a voice recognition system   **

## 2020-07-08 NOTE — ASSESSMENT & PLAN NOTE
Patients creatnine increased from 1 82 to 2 03  Morenaarielle Whitleyvis Patient's IV Lasix increased to 80 mg IV BD, per cardio recommendation  Possible CKD given context of uncontrolled HTN  Patient under went US kidney and bladder, per nephro rec  Normal US results

## 2020-07-08 NOTE — ASSESSMENT & PLAN NOTE
Patients BP today is 148/88, decreased from admission (231/128)  He is currently on   Lasix IV 80 BD, per cardiology recommendation  Amlodipine 10 mg OD,   Coreg 12 5 bid  clonidine 0 2 bid  and hydralazine PRN for sbp >160  Patient recommended to schedule an outpatient sleep study for JOHN

## 2020-07-08 NOTE — PROGRESS NOTES
Cardiology Progress Note - Vilma Rowe 44 y o  male MRN: [id number]    Unit/Bed#: -01 Encounter: [id number]      Assessment/Plan:    1  Lower extremity edema/ Exertional shortness due to Acute heart failure   - lower leg edema has improved, DVT ruled out   - troponin normalized  - ECHO- pending   - A1c- 5 9  - Negative FB- 790 since admission, no weight loss    Plan    Change IV Lasix to torsemide 40 per day  Continue corge, Add statin  monitor I/O, weight   Patient may require cardiac ischemia workup (likely cardiac cath) once kidney function improves      2  Right leg Cellulitis- on IV antibitotics     3  Hypertensive Emergency    -- discontinue amlodipine given significant lower extremity edema   -- Start hydralazine 25 mg and isosorbide dinitrate 10 mg TID  -- Continue carvedilol 12 5 mg twice    -- monitor BP  -  patient also needs workup for sleep apnea, check nocturnal pulse oximetry        4  SUMI vs CKD due to uncontrolled hypertension  -- Baseline Cr is unknown, Cr trending up 1 8 -->2 0  -- dose of diuretic adjusted  -- nephrology on board for further management      5  Morbidity  obesity- weight loss recommended    6  Hyperlipidemia - CHO- 209, TG-129, HDL- 33,   - start statin - lipitor 40 mg            Subjective:   Patient seen and examined  No significant events overnight  Patient denies any chest pain  Shortness of breath has improved  Objective:     Vitals: Blood pressure 148/88, pulse 68, temperature 97 8 °F (36 6 °C), resp  rate 19, height 5' 11" (1 803 m), weight (!) 167 kg (367 lb 4 6 oz), SpO2 93 %  , Body mass index is 51 23 kg/m² ,   Orthostatic Blood Pressures      Most Recent Value   Blood Pressure  148/88 filed at 07/08/2020 0729   Patient Position - Orthostatic VS  Sitting filed at 07/07/2020 2001            Intake/Output Summary (Last 24 hours) at 7/8/2020 0814  Last data filed at 7/8/2020 0805  Gross per 24 hour   Intake 1880 ml   Output 2270 ml   Net -390 ml Physical Exam:    GEN: Whitney Carrillo appears well, alert and oriented x 3, pleasant and cooperative moderately obese  HEENT:  Atraumatic  NECK: supple  HEART: regular rhythm, normal S1 and S2, no murmurs  LUNGS: clear to auscultation bilaterally; no wheezes, rales, or rhonchi   ABDOMEN: normal bowel sounds, soft, no tenderness, no distention  EXTREMITIES: peripheral pulses normal; no clubbing, cyanosis, 3+ edema plaques bilaterally    Medications:      Current Facility-Administered Medications:     acetaminophen (TYLENOL) tablet 650 mg, 650 mg, Oral, Q6H PRN, Violetta Cowing, DO    amLODIPine (NORVASC) tablet 10 mg, 10 mg, Oral, Daily, ABEL Maldonado, 10 mg at 07/08/20 0805    carvedilol (COREG) tablet 12 5 mg, 12 5 mg, Oral, BID With Meals, Lenora Underwood MD, 12 5 mg at 07/08/20 0804    ceFAZolin (ANCEF) IVPB (premix) 2,000 mg 50 mL, 2,000 mg, Intravenous, Q8H, Violetta Cowing, DO, Last Rate: 100 mL/hr at 07/08/20 0805, 2,000 mg at 07/08/20 0805    cloNIDine (CATAPRES) tablet 0 2 mg, 0 2 mg, Oral, Q12H [facility], Lneora Underwood MD, 0 2 mg at 07/08/20 0804    enoxaparin (LOVENOX) subcutaneous injection 60 mg, 60 mg, Subcutaneous, BID, Violetta Cowing, DO, 60 mg at 07/08/20 0805    furosemide (LASIX) injection 80 mg, 80 mg, Intravenous, BID (diuretic), Sarah Medina MD, 80 mg at 07/08/20 0804    hydrALAZINE (APRESOLINE) injection 10 mg, 10 mg, Intravenous, Q6H PRN, Rodrigo Keita MD, 10 mg at 07/07/20 2329    melatonin tablet 9 mg, 9 mg, Oral, HS PRN, Yajaira Soriano MD     Labs & Results:    Results from last 7 days   Lab Units 07/08/20  0412 07/07/20  2150 07/07/20  0504   TROPONIN I ng/mL 0 04 0 06* 0 05*     Results from last 7 days   Lab Units 07/08/20 07/07/20  0504 07/06/20  1358   WBC Thousand/uL 8 72 8 15 9 03   HEMOGLOBIN g/dL 15 1 14 2 15 4   HEMATOCRIT % 48 4 46 1 50 4*   PLATELETS Thousands/uL 200 189 202     Results from last 7 days   Lab Units 07/08/20   TRIGLYCERIDES mg/dL 129   HDL mg/dL 33*     Results from last 7 days   Lab Units 07/08/20 07/07/20  0504 07/06/20  1358   POTASSIUM mmol/L 3 9 3 9 5 1   CHLORIDE mmol/L 105 107 107   CO2 mmol/L 31 27 29   BUN mg/dL 24 24 24   CREATININE mg/dL 2 03* 1 82* 1 98*   CALCIUM mg/dL 8 9 8 2* 8 7   ALK PHOS U/L  --   --  112   ALT U/L  --   --  31   AST U/L  --   --  37     Results from last 7 days   Lab Units 07/06/20  1358   INR  1 10   PTT seconds 35     Results from last 7 days   Lab Units 07/07/20  0504   MAGNESIUM mg/dL 2 1

## 2020-07-08 NOTE — ASSESSMENT & PLAN NOTE
Wt Readings from Last 3 Encounters:   07/08/20 (!) 167 kg (367 lb 4 6 oz)       Patients IV lasix 40 BD increased to IV lasix 80 mg BD, per cardiology recommendations  Amlodipine 10 mg od, Coreg 12 5 mg BD, clonidine   2mg BD  Blood pressure today is 148/88, down from admission (237/124)  Patient net -700 as of 7/8/20, 11:00 am  7/7/20 net -590  Xray chest is normal, echo pending

## 2020-07-08 NOTE — ASSESSMENT & PLAN NOTE
Patient chao presented with erythema and tenderness on the R  Leg  He reports that he had blisters on the area that he scratched and ruptured  He reports pain and swelling have gone down since admit  He received rocephin in ER  He is currently receiving cefazolin 2g three times a day, first dose 7/7/20  Antibiotics may be d/c tomorrow based upon pending blood culture report  Afebrile today, WBC levels normal today

## 2020-07-08 NOTE — ASSESSMENT & PLAN NOTE
The 10-year ASCVD risk score (Renuka Szymanski et al , 2013) is: 8 6%    Values used to calculate the score:      Age: 44 years      Sex: Male      Is Non- : No      Diabetic: No      Tobacco smoker: No      Systolic Blood Pressure: 549 mmHg      Is BP treated: Yes      HDL Cholesterol: 33 mg/dL      Total Cholesterol: 209 mg/dL     Patients lipid profile shows elevated cholesterol  Placed on Lipitor 40 mg, OD

## 2020-07-09 PROBLEM — N25.81 SECONDARY HYPERPARATHYROIDISM OF RENAL ORIGIN (HCC): Status: ACTIVE | Noted: 2020-07-09

## 2020-07-09 LAB
ANION GAP SERPL CALCULATED.3IONS-SCNC: 6 MMOL/L (ref 4–13)
BASOPHILS # BLD AUTO: 0.03 THOUSANDS/ΜL (ref 0–0.1)
BASOPHILS NFR BLD AUTO: 0 % (ref 0–1)
BUN SERPL-MCNC: 26 MG/DL (ref 5–25)
CALCIUM SERPL-MCNC: 8.4 MG/DL (ref 8.3–10.1)
CHLORIDE SERPL-SCNC: 105 MMOL/L (ref 100–108)
CO2 SERPL-SCNC: 31 MMOL/L (ref 21–32)
CREAT SERPL-MCNC: 2.02 MG/DL (ref 0.6–1.3)
EOSINOPHIL # BLD AUTO: 0.14 THOUSAND/ΜL (ref 0–0.61)
EOSINOPHIL NFR BLD AUTO: 2 % (ref 0–6)
ERYTHROCYTE [DISTWIDTH] IN BLOOD BY AUTOMATED COUNT: 15.3 % (ref 11.6–15.1)
GFR SERPL CREATININE-BSD FRML MDRD: 37 ML/MIN/1.73SQ M
GLUCOSE SERPL-MCNC: 93 MG/DL (ref 65–140)
HCT VFR BLD AUTO: 48.7 % (ref 36.5–49.3)
HGB BLD-MCNC: 15 G/DL (ref 12–17)
IMM GRANULOCYTES # BLD AUTO: 0.03 THOUSAND/UL (ref 0–0.2)
IMM GRANULOCYTES NFR BLD AUTO: 0 % (ref 0–2)
LYMPHOCYTES # BLD AUTO: 1.06 THOUSANDS/ΜL (ref 0.6–4.47)
LYMPHOCYTES NFR BLD AUTO: 13 % (ref 14–44)
MCH RBC QN AUTO: 26.4 PG (ref 26.8–34.3)
MCHC RBC AUTO-ENTMCNC: 30.8 G/DL (ref 31.4–37.4)
MCV RBC AUTO: 86 FL (ref 82–98)
MONOCYTES # BLD AUTO: 0.63 THOUSAND/ΜL (ref 0.17–1.22)
MONOCYTES NFR BLD AUTO: 8 % (ref 4–12)
NEUTROPHILS # BLD AUTO: 6.06 THOUSANDS/ΜL (ref 1.85–7.62)
NEUTS SEG NFR BLD AUTO: 77 % (ref 43–75)
NRBC BLD AUTO-RTO: 0 /100 WBCS
PLATELET # BLD AUTO: 208 THOUSANDS/UL (ref 149–390)
PMV BLD AUTO: 11.1 FL (ref 8.9–12.7)
POTASSIUM SERPL-SCNC: 3.9 MMOL/L (ref 3.5–5.3)
RBC # BLD AUTO: 5.68 MILLION/UL (ref 3.88–5.62)
SODIUM SERPL-SCNC: 142 MMOL/L (ref 136–145)
WBC # BLD AUTO: 7.95 THOUSAND/UL (ref 4.31–10.16)

## 2020-07-09 PROCEDURE — 99233 SBSQ HOSP IP/OBS HIGH 50: CPT | Performed by: INTERNAL MEDICINE

## 2020-07-09 PROCEDURE — 99232 SBSQ HOSP IP/OBS MODERATE 35: CPT | Performed by: INTERNAL MEDICINE

## 2020-07-09 PROCEDURE — 80048 BASIC METABOLIC PNL TOTAL CA: CPT | Performed by: INTERNAL MEDICINE

## 2020-07-09 PROCEDURE — 85025 COMPLETE CBC W/AUTO DIFF WBC: CPT | Performed by: INTERNAL MEDICINE

## 2020-07-09 RX ORDER — ACETYLCYSTEINE 200 MG/ML
600 SOLUTION ORAL; RESPIRATORY (INHALATION) 2 TIMES DAILY
Status: DISCONTINUED | OUTPATIENT
Start: 2020-07-09 | End: 2020-07-11 | Stop reason: HOSPADM

## 2020-07-09 RX ORDER — ERGOCALCIFEROL 1.25 MG/1
50000 CAPSULE ORAL WEEKLY
Status: DISCONTINUED | OUTPATIENT
Start: 2020-07-09 | End: 2020-07-11 | Stop reason: HOSPADM

## 2020-07-09 RX ORDER — TORSEMIDE 20 MG/1
40 TABLET ORAL DAILY
Status: DISCONTINUED | OUTPATIENT
Start: 2020-07-10 | End: 2020-07-09

## 2020-07-09 RX ADMIN — HYDRALAZINE HYDROCHLORIDE 25 MG: 25 TABLET ORAL at 13:09

## 2020-07-09 RX ADMIN — ACETYLCYSTEINE 600 MG: 200 SOLUTION ORAL; RESPIRATORY (INHALATION) at 17:26

## 2020-07-09 RX ADMIN — CARVEDILOL 12.5 MG: 12.5 TABLET, FILM COATED ORAL at 09:09

## 2020-07-09 RX ADMIN — ISOSORBIDE DINITRATE 10 MG: 10 TABLET ORAL at 13:04

## 2020-07-09 RX ADMIN — ISOSORBIDE DINITRATE 10 MG: 10 TABLET ORAL at 17:24

## 2020-07-09 RX ADMIN — ENOXAPARIN SODIUM 60 MG: 60 INJECTION SUBCUTANEOUS at 17:25

## 2020-07-09 RX ADMIN — HYDRALAZINE HYDROCHLORIDE 25 MG: 25 TABLET ORAL at 05:57

## 2020-07-09 RX ADMIN — ATORVASTATIN CALCIUM 40 MG: 40 TABLET, FILM COATED ORAL at 17:25

## 2020-07-09 RX ADMIN — ENOXAPARIN SODIUM 60 MG: 60 INJECTION SUBCUTANEOUS at 09:10

## 2020-07-09 RX ADMIN — ACETYLCYSTEINE 600 MG: 200 SOLUTION ORAL; RESPIRATORY (INHALATION) at 13:09

## 2020-07-09 RX ADMIN — HYDRALAZINE HYDROCHLORIDE 25 MG: 25 TABLET ORAL at 23:25

## 2020-07-09 RX ADMIN — CEFAZOLIN SODIUM 2000 MG: 2 SOLUTION INTRAVENOUS at 01:43

## 2020-07-09 RX ADMIN — CEFAZOLIN SODIUM 2000 MG: 2 SOLUTION INTRAVENOUS at 09:10

## 2020-07-09 RX ADMIN — CARVEDILOL 12.5 MG: 12.5 TABLET, FILM COATED ORAL at 17:23

## 2020-07-09 RX ADMIN — ERGOCALCIFEROL 50000 UNITS: 1.25 CAPSULE ORAL at 13:07

## 2020-07-09 RX ADMIN — ISOSORBIDE DINITRATE 10 MG: 10 TABLET ORAL at 09:09

## 2020-07-09 NOTE — ASSESSMENT & PLAN NOTE
PTH elevated  Vitamin-D low  Patient started on 28761 units of ergocalciferol every 7 days  First dose 07/09/2020

## 2020-07-09 NOTE — PLAN OF CARE
Problem: INFECTION - ADULT  Goal: Absence or prevention of progression during hospitalization  Description  INTERVENTIONS:  - Assess and monitor for signs and symptoms of infection  - Monitor lab/diagnostic results  - Monitor all insertion sites, i e  indwelling lines, tubes, and drains  - Monitor endotracheal if appropriate and nasal secretions for changes in amount and color  - [facility] appropriate cooling/warming therapies per order  - Administer medications as ordered  - Instruct and encourage patient and family to use good hand hygiene technique  - Identify and instruct in appropriate isolation precautions for identified infection/condition  Outcome: Progressing     Problem: DISCHARGE PLANNING  Goal: Discharge to home or other facility with appropriate resources  Description  INTERVENTIONS:  - Identify barriers to discharge w/patient and caregiver  - Arrange for needed discharge resources and transportation as appropriate  - Identify discharge learning needs (meds, wound care, etc )  - Arrange for interpretive services to assist at discharge as needed  - Refer to Case Management Department for coordinating discharge planning if the patient needs post-hospital services based on physician/advanced practitioner order or complex needs related to functional status, cognitive ability, or social support system  Outcome: Progressing     Problem: CARDIOVASCULAR - ADULT  Goal: Maintains optimal cardiac output and hemodynamic stability  Description  INTERVENTIONS:  - Monitor I/O, vital signs and rhythm  - Monitor for S/S and trends of decreased cardiac output  - Administer and titrate ordered vasoactive medications to optimize hemodynamic stability  - Assess quality of pulses, skin color and temperature  - Assess for signs of decreased coronary artery perfusion  - Instruct patient to report change in severity of symptoms  Outcome: Progressing     Problem: CARDIOVASCULAR - ADULT  Goal: Absence of cardiac dysrhythmias or at baseline rhythm  Description  INTERVENTIONS:  - Continuous cardiac monitoring, vital signs, obtain 12 lead EKG if ordered  - Administer antiarrhythmic and heart rate control medications as ordered  - Monitor electrolytes and administer replacement therapy as ordered  Outcome: Progressing     Problem: METABOLIC, FLUID AND ELECTROLYTES - ADULT  Goal: Fluid balance maintained  Description  INTERVENTIONS:  - Monitor labs   - Monitor I/O and WT  - Instruct patient on fluid and nutrition as appropriate  - Assess for signs & symptoms of volume excess or deficit  Outcome: Progressing

## 2020-07-09 NOTE — PROGRESS NOTES
NEPHROLOGY PROGRESS NOTE    Patient: Jb Cruz               Sex: male          DOA: 7/6/2020  1:37 PM   YOB: 1970        Age:  44 y o         LOS:  LOS: 3 days   7/9/2020    REASON FOR THE CONSULTATION:      Acute kidney injury    SUBJECTIVE     Patient seen and examined next to the bedside  Feels well today  Admits to improvement in lower extremity swelling  CURRENT MEDICATIONS       Current Facility-Administered Medications:     acetaminophen (TYLENOL) tablet 650 mg, 650 mg, Oral, Q6H PRN, Aurelia Rae DO    acetylcysteine (MUCOMYST) 200 mg/mL oral solution 600 mg, 600 mg, Oral, BID, David Ackerman MD    atorvastatin (LIPITOR) tablet 40 mg, 40 mg, Oral, Daily With Angel Kay MD, 40 mg at 07/08/20 1555    carvedilol (COREG) tablet 12 5 mg, 12 5 mg, Oral, BID With Meals, Duarte Jackson MD, 12 5 mg at 07/09/20 0909    enoxaparin (LOVENOX) subcutaneous injection 60 mg, 60 mg, Subcutaneous, BID, Aurelia Rae DO, 60 mg at 07/09/20 0910    hydrALAZINE (APRESOLINE) injection 10 mg, 10 mg, Intravenous, Q6H PRN, Rodrigo Keita MD, 10 mg at 07/07/20 2329    hydrALAZINE (APRESOLINE) tablet 25 mg, 25 mg, Oral, Q8H [address], Darwin Vásquez MD, 25 mg at 07/09/20 0557    isosorbide dinitrate (ISORDIL) tablet 10 mg, 10 mg, Oral, TID after meals, Darwin Vásquez MD, 10 mg at 07/09/20 0909    melatonin tablet 9 mg, 9 mg, Oral, HS PRN, David Ackerman MD    [START ON 7/10/2020] torsemide (DEMADEX) tablet 40 mg, 40 mg, Oral, Daily, David Ackerman MD    REVIEW OF SYSTEMS     Review of Systems   Constitutional: Negative  HENT: Negative  Eyes: Negative  Respiratory: Negative  Cardiovascular: Negative  Gastrointestinal: Negative  Endocrine: Negative  Genitourinary: Negative  Musculoskeletal: Negative  Skin: Negative  Allergic/Immunologic: Negative  Neurological: Negative  Hematological: Negative      All other systems reviewed and are negative  OBJECTIVE     Current Weight: Weight - Scale: (!) 166 kg (365 lb 1 3 oz)  Vitals:    07/09/20 0802   BP:    Pulse:    Resp:    Temp:    SpO2: 95%     Body mass index is 50 92 kg/m²  Intake/Output Summary (Last 24 hours) at 7/9/2020 1047  Last data filed at 7/9/2020 0551  Gross per 24 hour   Intake 660 ml   Output 1650 ml   Net -990 ml       PHYSICAL EXAMINATION     Physical Exam   Constitutional: He is oriented to person, place, and time  HENT:   Head: Normocephalic and atraumatic  Eyes: Pupils are equal, round, and reactive to light  Neck: Neck supple  No JVD present  Cardiovascular: Normal rate, regular rhythm and normal heart sounds  Exam reveals no friction rub  No murmur heard  Pulmonary/Chest: Effort normal and breath sounds normal    Abdominal: Soft  Bowel sounds are normal  He exhibits no distension  There is no tenderness  There is no rebound  Musculoskeletal: He exhibits no edema or tenderness  Neurological: He is alert and oriented to person, place, and time  Skin: Skin is dry  No rash noted  Psychiatric: He has a normal mood and affect  LAB RESULTS     Results from last 7 days   Lab Units 07/09/20  0550 07/08/20 07/07/20  0504 07/06/20  1358   WBC Thousand/uL 7 95 8 72 8 15 9 03   HEMOGLOBIN g/dL 15 0 15 1 14 2 15 4   HEMATOCRIT % 48 7 48 4 46 1 50 4*   PLATELETS Thousands/uL 208 200 189 202   POTASSIUM mmol/L 3 9 3 9 3 9 5 1   CHLORIDE mmol/L 105 105 107 107   CO2 mmol/L 31 31 27 29   BUN mg/dL 26* 24 24 24   CREATININE mg/dL 2 02* 2 03* 1 82* 1 98*   EGFR ml/min/1 73sq m 37 37 42 38   CALCIUM mg/dL 8 4 8 9 8 2* 8 7   MAGNESIUM mg/dL  --   --  2 1  --    PHOSPHORUS mg/dL  --   --  3 9  --            RADIOLOGY RESULTS      Results for orders placed during the hospital encounter of 07/06/20   XR chest 1 view portable    Narrative CHEST     INDICATION:   dyspnea      COMPARISON:  None    EXAM PERFORMED/VIEWS:  XR CHEST PORTABLE      FINDINGS:    Cardiomediastinal silhouette appears unremarkable  The lungs are clear  No pneumothorax or pleural effusion  Osseous structures appear within normal limits for patient age  Impression No acute cardiopulmonary disease  Workstation performed: [id number]       No results found for this or any previous visit  ASSESSMENT/PLAN     68-year-old male with past medical history of obesity, remote history of elevated blood pressure who presents with lower extremity edema and found to have acute kidney injury  1  Acute kidney injury:  Baseline serum creatinine is unknown but suspect underlying CKD secondary to hypertension  undiagnosed  -presented with a serum creatinine 1 9 which is currently at 2 0 mg/dL after diuresis achieved  -renal ultrasound is unremarkable     -urinalysis with trace protein  2  Hypertension:  Uncontrolled upon presentation  Initiated on carvedilol, clonidine and amlodipine with improved blood pressures   -clonidine and amlodipine discontinued  -patient switched to Isordil and hydralazine by Cardiology  3  Secondary hyperparathyroidism of renal origin:  Elevated PTH intact and low 25 hydroxy vitamin-D  initiated patient on ergocalciferol 20636 units 1 tablet Q 7 days  4  Lower extremity edema:  Elevated BNP and CHF with being ruled out  Diuresed well with IV Lasix  On torsemide 40 mg PO daily  Case discussed with isacc Tran MD  Nephrology  7/9/2020

## 2020-07-09 NOTE — PLAN OF CARE
Problem: Potential for Falls  Goal: Patient will remain free of falls  Description  INTERVENTIONS:  - Assess patient frequently for physical needs  -  Identify cognitive and physical deficits and behaviors that affect risk of falls    -  [place] fall precautions as indicated by assessment   - Educate patient/family on patient safety including physical limitations  - Instruct patient to call for assistance with activity based on assessment  - Modify environment to reduce risk of injury  - Consider OT/PT consult to assist with strengthening/mobility  Outcome: Progressing     Problem: PAIN - ADULT  Goal: Verbalizes/displays adequate comfort level or baseline comfort level  Description  Interventions:  - Encourage patient to monitor pain and request assistance  - Assess pain using appropriate pain scale  - Administer analgesics based on type and severity of pain and evaluate response  - Implement non-pharmacological measures as appropriate and evaluate response  - Consider cultural and social influences on pain and pain management  - Notify physician/advanced practitioner if interventions unsuccessful or patient reports new pain  Outcome: Progressing     Problem: INFECTION - ADULT  Goal: Absence or prevention of progression during hospitalization  Description  INTERVENTIONS:  - Assess and monitor for signs and symptoms of infection  - Monitor lab/diagnostic results  - Monitor all insertion sites, i e  indwelling lines, tubes, and drains  - Monitor endotracheal if appropriate and nasal secretions for changes in amount and color  - [place] appropriate cooling/warming therapies per order  - Administer medications as ordered  - Instruct and encourage patient and family to use good hand hygiene technique  - Identify and instruct in appropriate isolation precautions for identified infection/condition  Outcome: Progressing  Goal: Absence of fever/infection during neutropenic period  Description  INTERVENTIONS:  - Monitor WBC    Outcome: Progressing     Problem: SAFETY ADULT  Goal: Maintain or return to baseline ADL function  Description  INTERVENTIONS:  -  Assess patient's ability to carry out ADLs; assess patient's baseline for ADL function and identify physical deficits which impact ability to perform ADLs (bathing, care of mouth/teeth, toileting, grooming, dressing, etc )  - Assess/evaluate cause of self-care deficits   - Assess range of motion  - Assess patient's mobility; develop plan if impaired  - Assess patient's need for assistive devices and provide as appropriate  - Encourage maximum independence but intervene and supervise when necessary  - Involve family in performance of ADLs  - Assess for home care needs following discharge   - Consider OT consult to assist with ADL evaluation and planning for discharge  - Provide patient education as appropriate  Outcome: Progressing  Goal: Maintain or return mobility status to optimal level  Description  INTERVENTIONS:  - Assess patient's baseline mobility status (ambulation, transfers, stairs, etc )    - Identify cognitive and physical deficits and behaviors that affect mobility  - Identify mobility aids required to assist with transfers and/or ambulation (gait belt, sit-to-stand, lift, walker, cane, etc )  - [place] fall precautions as indicated by assessment  - Record patient progress and toleration of activity level on Mobility SBAR; progress patient to next Phase/Stage  - Instruct patient to call for assistance with activity based on assessment  - Consider rehabilitation consult to assist with strengthening/weightbearing, etc   Outcome: Progressing     Problem: DISCHARGE PLANNING  Goal: Discharge to home or other facility with appropriate resources  Description  INTERVENTIONS:  - Identify barriers to discharge w/patient and caregiver  - Arrange for needed discharge resources and transportation as appropriate  - Identify discharge learning needs (meds, wound care, etc )  - Arrange for interpretive services to assist at discharge as needed  - Refer to Case Management Department for coordinating discharge planning if the patient needs post-hospital services based on physician/advanced practitioner order or complex needs related to functional status, cognitive ability, or social support system  Outcome: Progressing     Problem: Knowledge Deficit  Goal: Patient/family/caregiver demonstrates understanding of disease process, treatment plan, medications, and discharge instructions  Description  Complete learning assessment and assess knowledge base    Interventions:  - Provide teaching at level of understanding  - Provide teaching via preferred learning methods  Outcome: Progressing     Problem: CARDIOVASCULAR - ADULT  Goal: Maintains optimal cardiac output and hemodynamic stability  Description  INTERVENTIONS:  - Monitor I/O, vital signs and rhythm  - Monitor for S/S and trends of decreased cardiac output  - Administer and titrate ordered vasoactive medications to optimize hemodynamic stability  - Assess quality of pulses, skin color and temperature  - Assess for signs of decreased coronary artery perfusion  - Instruct patient to report change in severity of symptoms  Outcome: Progressing  Goal: Absence of cardiac dysrhythmias or at baseline rhythm  Description  INTERVENTIONS:  - Continuous cardiac monitoring, vital signs, obtain 12 lead EKG if ordered  - Administer antiarrhythmic and heart rate control medications as ordered  - Monitor electrolytes and administer replacement therapy as ordered  Outcome: Progressing     Problem: METABOLIC, FLUID AND ELECTROLYTES - ADULT  Goal: Electrolytes maintained within normal limits  Description  INTERVENTIONS:  - Monitor labs and assess patient for signs and symptoms of electrolyte imbalances  - Administer electrolyte replacement as ordered  - Monitor response to electrolyte replacements, including repeat lab results as appropriate  - Instruct patient on fluid and nutrition as appropriate  Outcome: Progressing  Goal: Fluid balance maintained  Description  INTERVENTIONS:  - Monitor labs   - Monitor I/O and WT  - Instruct patient on fluid and nutrition as appropriate  - Assess for signs & symptoms of volume excess or deficit  Outcome: Progressing

## 2020-07-09 NOTE — PROGRESS NOTES
Progress Note - Jb Pals 1970, 44 y o  male MRN: [id number]    Unit/Bed#: -01 Encounter: [id number]    Primary Care Provider: Haleigh Gregg PA-C   Date and time admitted to hospital: 7/6/2020  1:37 PM        * Acute heart failure [facility])  Assessment & Plan  Wt Readings from Last 3 Encounters:   07/09/20 (!) 166 kg (365 lb 1 3 oz)       Patients IV lasix discontinued and amlodipine discontinued  torsemide 40 mg oral od started (first dose 7/9/20), per cardiology  Isordil 10 mg t i d  , Coreg 12 5 mg BD, hydralazine 25 mg oral TID  Blood pressure today is 152/97, down from admission (237/124)  Patient net -790 on 7/8/20  Xray chest is normal   Cardio on board  Echo shows left ventricular concentric hypertrophy, grade 2 diastolic dysfunction  Catheterization scheduled for 07/10/2020  Lajean Cassette Hypertensive urgency  Assessment & Plan  Patients BP today is 152/96, decreased from admission (231/128)  He is currently on   Lasix discontinued, torsemide 40 mg oral Od started (1st dose 07/09/2020) per cardiology recommendation  Isordil time 10 mg t i d ,   Coreg 12 5 bid  Hydralazine 25 mg oral t i d  Patient recommended to schedule an outpatient sleep study for JOHN  Cellulitis of left lower extremity  Assessment & Plan  Patient inititially presented with erythema and tenderness on the R  Leg  He reports that he had blisters on the area that he scratched and ruptured  He reports pain and swelling have gone down since admit  He received rocephin in ER  He received cefazolin 2g three times a day, first dose 7/7/20  Blood cultures at 48 hours show no growth  Patient is afebrile, WBC are not elevated  IV antibiotics are being discontinued (07/09/2020)  Elevated serum creatinine  Assessment & Plan  Patients creatnine is 2 02  BUN is 26  Creatinine and bun are staying stable  Lasix discontinued, torsemide 40 mg OD started (1st dose 07/09/2020), per cardio recommendation       Possible CKD given context of uncontrolled HTN  Patient under went US kidney and bladder, per nephro rec  Normal US results  Secondary hyperparathyroidism of renal origin [facility])  Assessment & Plan  PTH elevated  Vitamin-D low  Patient started on 67712 units of ergocalciferol every 7 days  First dose 2020  Hyperlipidemia  Assessment & Plan  The 10-year ASCVD risk score (Sharon Esquivel et al , 2013) is: 10 1%    Values used to calculate the score:      Age: 44 years      Sex: Male      Is Non- : No      Diabetic: No      Tobacco smoker: No      Systolic Blood Pressure: 138 mmHg      Is BP treated: Yes      HDL Cholesterol: 33 mg/dL      Total Cholesterol: 209 mg/dL     Patients lipid profile shows elevated cholesterol  Placed on Lipitor 40 mg, OD  Insomnia  Assessment & Plan  Patient reports insomnia  Currently being given melatonin 9mg, PRN  Morbid obesity due to excess calories (HCC)  Assessment & Plan  RD consult, A1C: 5 7  TSH normal    Polyuria  Assessment & Plan  A1C 5 4  Patient currently on torsemide 40 mg OD, 1st dose (2020)          VTE Pharmacologic Prophylaxis:   Pharmacologic: Enoxaparin (Lovenox)  Mechanical VTE Prophylaxis in Place: Yes    Discussions with Specialists or Other Care Team Provider:  Cardiology    Education and Discussions with Family / Patient:  Done by attending at bedside    Current Length of Stay: 3 day(s)    Current Patient Status: Inpatient     Discharge Plan / Estimated Discharge Date:  Pending    Code Status: Level 1 - Full Code      Subjective:   Patient reports that he is doing well  He denies any shortness of breath  He has had a decrease in pedal edema since the time of admission  He says there is decreased tenderness in his left ankle  He denies any chest pain, palpitations, headache and blurriness in vision      Objective:     Vitals:   Temp (24hrs), Av 1 °F (36 7 °C), Min:97 8 °F (36 6 °C), Max:98 8 °F (37 1 °C)    Temp:  [97 8 °F (36 6 °C)-98 8 °F (37 1 °C)] 98 2 °F (36 8 °C)  HR:  [72-84] 76  Resp:  [15-19] 19  BP: (131-156)/(82-97) 156/97  SpO2:  [90 %-95 %] 90 %  Body mass index is 50 92 kg/m²  Input and Output Summary (last 24 hours): Intake/Output Summary (Last 24 hours) at 7/9/2020 1659  Last data filed at 7/9/2020 0900  Gross per 24 hour   Intake 660 ml   Output 450 ml   Net 210 ml       Physical Exam:     Physical Exam   Constitutional: He is oriented to person, place, and time  He appears well-developed and well-nourished  HENT:   Head: Normocephalic and atraumatic  Eyes: EOM are normal    Neck: Normal range of motion  Cardiovascular: Normal rate and regular rhythm  Pulmonary/Chest: Effort normal  No respiratory distress  Musculoskeletal:   Decreased in edema, erythema, and tenderness of L  Ankle as compared to previous day  Neurological: He is alert and oriented to person, place, and time  Psychiatric: He has a normal mood and affect  Additional Data:     Labs:    Results from last 7 days   Lab Units 07/09/20  0550   WBC Thousand/uL 7 95   HEMOGLOBIN g/dL 15 0   HEMATOCRIT % 48 7   PLATELETS Thousands/uL 208   NEUTROS PCT % 77*   LYMPHS PCT % 13*   MONOS PCT % 8   EOS PCT % 2     Results from last 7 days   Lab Units 07/09/20  0550  07/06/20  1358   POTASSIUM mmol/L 3 9   < > 5 1   CHLORIDE mmol/L 105   < > 107   CO2 mmol/L 31   < > 29   BUN mg/dL 26*   < > 24   CREATININE mg/dL 2 02*   < > 1 98*   CALCIUM mg/dL 8 4   < > 8 7   ALK PHOS U/L  --   --  112   ALT U/L  --   --  31   AST U/L  --   --  37    < > = values in this interval not displayed  Results from last 7 days   Lab Units 07/06/20  1358   INR  1 10       * I Have Reviewed All Lab Data Listed Above  * Additional Pertinent Lab Tests Reviewed:  All Labs Within Last 24 Hours Reviewed    Imaging:    Imaging Reports Reviewed Today Include:  Echo  Imaging Personally Reviewed by Myself Includes:  Echo    Recent Cultures (last 7 days): Results from last 7 days   Lab Units 07/06/20  1555 07/06/20  1540   BLOOD CULTURE  No Growth at 48 hrs  No Growth at 48 hrs  Last 24 Hours Medication List:     Current Facility-Administered Medications:  acetaminophen 650 mg Oral Q6H PRN Rober Churhcill DO   acetylcysteine 600 mg Oral BID Dontrell Avila MD   atorvastatin 40 mg Oral Daily With Onnie Primrose, MD   carvedilol 12 5 mg Oral BID With Meals Irina Massey MD   enoxaparin 60 mg Subcutaneous BID Rober Churchill DO   ergocalciferol 50,000 Units Oral Weekly Irina Massey MD   hydrALAZINE 10 mg Intravenous Q6H PRN Merle Blankenship MD   hydrALAZINE 25 mg Oral Q8H Nacho Coreas MD   isosorbide dinitrate 10 mg Oral TID after meals Maisha Meyers MD   melatonin 9 mg Oral HS PRN MD Gary Galindo ON 7/10/2020] torsemide 40 mg Oral Daily Dontrell Avila MD        Today, Patient Was Seen By: Dontrell Avila MD    ** Please Note: This note has been constructed using a voice recognition system   **

## 2020-07-09 NOTE — ASSESSMENT & PLAN NOTE
Patient chao presented with erythema and tenderness on the R  Leg  He reports that he had blisters on the area that he scratched and ruptured  He reports pain and swelling have gone down since admit  He received rocephin in ER  He received cefazolin 2g three times a day, first dose 7/7/20  Blood cultures at 48 hours show no growth  Patient is afebrile, WBC are not elevated  IV antibiotics are being discontinued (07/09/2020)

## 2020-07-09 NOTE — ASSESSMENT & PLAN NOTE
Patients BP today is 152/96, decreased from admission (231/128)  He is currently on   Lasix discontinued, torsemide 40 mg oral Od started (1st dose 07/09/2020) per cardiology recommendation  Isordil time 10 mg t i d ,   Coreg 12 5 bid  Hydralazine 25 mg oral t i d  Patient recommended to schedule an outpatient sleep study for JOHN

## 2020-07-09 NOTE — ASSESSMENT & PLAN NOTE
Patients creatnine is 2 02  BUN is 26  Creatinine and bun are staying stable  Lasix discontinued, torsemide 40 mg OD started (1st dose 07/09/2020), per cardio recommendation  Possible CKD given context of uncontrolled HTN  Patient under went US kidney and bladder, per nephro rec  Normal US results

## 2020-07-09 NOTE — ASSESSMENT & PLAN NOTE
Wt Readings from Last 3 Encounters:   07/09/20 (!) 166 kg (365 lb 1 3 oz)       Patients IV lasix discontinued and amlodipine discontinued  torsemide 40 mg oral od started (first dose 7/9/20), per cardiology  Isordil 10 mg t i d  , Coreg 12 5 mg BD, hydralazine 25 mg oral TID  Blood pressure today is 152/97, down from admission (237/124)  Patient net -790 on 7/8/20  Xray chest is normal   Cardio on board  Echo shows left ventricular concentric hypertrophy, grade 2 diastolic dysfunction  Catheterization scheduled for 07/10/2020  Karrie Nissen

## 2020-07-09 NOTE — PROGRESS NOTES
Cardiology Progress Note - Tram Olson 44 y o  male MRN: [id number]    Unit/Bed#: -01 Encounter: [id number]      Assessment/Plan:    1  Lower extremity edema/ Exertional shortness due to Acute heart failure   - symptoms improved, DVT ruled out   - ECHO- EF 54%, grade 2 diastolic dysfunction  - R9F- 5 9     Plan     Continue torsemide 40 per day  Continue corge, lipitor   monitor I/O, weight , fluid restriction  Patient will be tentatively scheduled for cardiac catheterization tomorrow (risks and benefits discussed thoroughly with the patient  Patient opted to undergo cardiac catheterization at this time)  2  Right leg Cellulitis- on IV antibitotics     3  Hypertension   -- Hypertension has improved, systolic in 706D  -- continue hydralazine 25 mg and isosorbide dinitrate 10 mg TID  -- Continue carvedilol 12 5 mg twice    -- monitor BP  -  patient also needs workup for sleep apnea, check nocturnal pulse oximetry        4  SUMI vs CKD due to uncontrolled hypertension  -- Baseline Cr is unknown, Cr trending up 1 8 -->2 0  -- nephrology on board for further management      5  Morbidity  obesity- weight loss recommended     6  Hyperlipidemia - CHO- 209, TG-129, HDL- 33,   - continue lipitor 40 mg          Subjective:   Patient seen and examined  No significant events overnight  Patient resting in her recliner  No new complaints  No significant shortness of breath  Lower extremity edema has improved  Objective:     Vitals: Blood pressure 154/96, pulse 75, temperature 98 8 °F (37 1 °C), resp  rate 19, height 5' 11" (1 803 m), weight (!) 166 kg (365 lb 1 3 oz), SpO2 90 %  , Body mass index is 50 92 kg/m² ,   Orthostatic Blood Pressures      Most Recent Value   Blood Pressure  154/96 filed at 07/09/2020 1215   Patient Position - Orthostatic VS  Sitting filed at 07/07/2020 2001            Intake/Output Summary (Last 24 hours) at 7/9/2020 1319  Last data filed at 7/9/2020 0551  Gross per 24 hour   Intake 420 ml   Output 750 ml   Net -330 ml         Physical Exam:    GEN: Gabbie Gomez appears well, alert and oriented x 3, pleasant and cooperative   HEENT: pupils equal, round, and reactive to light; extraocular muscles intact  NECK: supple, no carotid bruits   HEART: regular rhythm, normal S1 and S2, no murmurs, clicks, gallops or rubs   LUNGS: decreased breath sounds, clear   ABDOMEN: normal bowel sounds, soft, no tenderness, no distention  EXTREMITIES: peripheral pulses normal; no clubbing, cyanosis, 2+ edema     Medications:      Current Facility-Administered Medications:     acetaminophen (TYLENOL) tablet 650 mg, 650 mg, Oral, Q6H PRN, Chito Christy DO    acetylcysteine (MUCOMYST) 200 mg/mL oral solution 600 mg, 600 mg, Oral, BID, Charlie Haley MD, 600 mg at 07/09/20 1309    atorvastatin (LIPITOR) tablet 40 mg, 40 mg, Oral, Daily With Maren Acharya MD, 40 mg at 07/08/20 1555    carvedilol (COREG) tablet 12 5 mg, 12 5 mg, Oral, BID With Meals, David Chavez MD, 12 5 mg at 07/09/20 0909    enoxaparin (LOVENOX) subcutaneous injection 60 mg, 60 mg, Subcutaneous, BID, Chito Christy DO, 60 mg at 07/09/20 0910    ergocalciferol (VITAMIN D2) capsule 50,000 Units, 50,000 Units, Oral, Weekly, David Chavez MD, 50,000 Units at 07/09/20 1307    hydrALAZINE (APRESOLINE) injection 10 mg, 10 mg, Intravenous, Q6H PRN, Michelle Bedoya MD, 10 mg at 07/07/20 2329    hydrALAZINE (APRESOLINE) tablet 25 mg, 25 mg, Oral, Q8H [facility], Shirley Smith MD, 25 mg at 07/09/20 1309    isosorbide dinitrate (ISORDIL) tablet 10 mg, 10 mg, Oral, TID after meals, Shirley Smith MD, 10 mg at 07/09/20 1304    melatonin tablet 9 mg, 9 mg, Oral, HS PRN, Charlie Haley MD    [START ON 7/10/2020] torsemide (DEMADEX) tablet 40 mg, 40 mg, Oral, Daily, Charlie Haley MD     Labs & Results:    Results from last 7 days   Lab Units 07/08/20  0412 07/07/20  2150 07/07/20  0504   TROPONIN I ng/mL 0 04 0 06* 0 05* Results from last 7 days   Lab Units 07/09/20  0550 07/08/20 07/07/20  0504   WBC Thousand/uL 7 95 8 72 8 15   HEMOGLOBIN g/dL 15 0 15 1 14 2   HEMATOCRIT % 48 7 48 4 46 1   PLATELETS Thousands/uL 208 200 189     Results from last 7 days   Lab Units 07/08/20   TRIGLYCERIDES mg/dL 129   HDL mg/dL 33*     Results from last 7 days   Lab Units 07/09/20  0550 07/08/20 07/07/20  0504 07/06/20  1358   POTASSIUM mmol/L 3 9 3 9 3 9 5 1   CHLORIDE mmol/L 105 105 107 107   CO2 mmol/L 31 31 27 29   BUN mg/dL 26* 24 24 24   CREATININE mg/dL 2 02* 2 03* 1 82* 1 98*   CALCIUM mg/dL 8 4 8 9 8 2* 8 7   ALK PHOS U/L  --   --   --  112   ALT U/L  --   --   --  31   AST U/L  --   --   --  37     Results from last 7 days   Lab Units 07/06/20  1358   INR  1 10   PTT seconds 35     Results from last 7 days   Lab Units 07/07/20  0504   MAGNESIUM mg/dL 2 1

## 2020-07-09 NOTE — ASSESSMENT & PLAN NOTE
The 10-year ASCVD risk score (Mariana Laam et al , 2013) is: 10 1%    Values used to calculate the score:      Age: 44 years      Sex: Male      Is Non- : No      Diabetic: No      Tobacco smoker: No      Systolic Blood Pressure: 974 mmHg      Is BP treated: Yes      HDL Cholesterol: 33 mg/dL      Total Cholesterol: 209 mg/dL     Patients lipid profile shows elevated cholesterol  Placed on Lipitor 40 mg, OD

## 2020-07-10 ENCOUNTER — APPOINTMENT (INPATIENT)
Dept: INTERVENTIONAL RADIOLOGY/VASCULAR | Facility: HOSPITAL | Age: 50
DRG: 192 | End: 2020-07-10
Payer: COMMERCIAL

## 2020-07-10 PROBLEM — E55.9 VITAMIN D DEFICIENCY: Status: ACTIVE | Noted: 2020-07-10

## 2020-07-10 LAB
ANION GAP SERPL CALCULATED.3IONS-SCNC: 8 MMOL/L (ref 4–13)
BASOPHILS # BLD AUTO: 0.04 THOUSANDS/ΜL (ref 0–0.1)
BASOPHILS NFR BLD AUTO: 1 % (ref 0–1)
BUN SERPL-MCNC: 22 MG/DL (ref 5–25)
CALCIUM SERPL-MCNC: 8.6 MG/DL (ref 8.3–10.1)
CHLORIDE SERPL-SCNC: 103 MMOL/L (ref 100–108)
CO2 SERPL-SCNC: 28 MMOL/L (ref 21–32)
CREAT SERPL-MCNC: 1.84 MG/DL (ref 0.6–1.3)
EOSINOPHIL # BLD AUTO: 0.1 THOUSAND/ΜL (ref 0–0.61)
EOSINOPHIL NFR BLD AUTO: 1 % (ref 0–6)
ERYTHROCYTE [DISTWIDTH] IN BLOOD BY AUTOMATED COUNT: 15 % (ref 11.6–15.1)
GFR SERPL CREATININE-BSD FRML MDRD: 42 ML/MIN/1.73SQ M
GLUCOSE SERPL-MCNC: 88 MG/DL (ref 65–140)
HCT VFR BLD AUTO: 50.7 % (ref 36.5–49.3)
HGB BLD-MCNC: 15.6 G/DL (ref 12–17)
IMM GRANULOCYTES # BLD AUTO: 0.02 THOUSAND/UL (ref 0–0.2)
IMM GRANULOCYTES NFR BLD AUTO: 0 % (ref 0–2)
LYMPHOCYTES # BLD AUTO: 0.9 THOUSANDS/ΜL (ref 0.6–4.47)
LYMPHOCYTES NFR BLD AUTO: 13 % (ref 14–44)
MCH RBC QN AUTO: 26.4 PG (ref 26.8–34.3)
MCHC RBC AUTO-ENTMCNC: 30.8 G/DL (ref 31.4–37.4)
MCV RBC AUTO: 86 FL (ref 82–98)
MONOCYTES # BLD AUTO: 0.65 THOUSAND/ΜL (ref 0.17–1.22)
MONOCYTES NFR BLD AUTO: 9 % (ref 4–12)
NEUTROPHILS # BLD AUTO: 5.34 THOUSANDS/ΜL (ref 1.85–7.62)
NEUTS SEG NFR BLD AUTO: 76 % (ref 43–75)
NRBC BLD AUTO-RTO: 0 /100 WBCS
PLATELET # BLD AUTO: 218 THOUSANDS/UL (ref 149–390)
PMV BLD AUTO: 11 FL (ref 8.9–12.7)
POTASSIUM SERPL-SCNC: 4 MMOL/L (ref 3.5–5.3)
RBC # BLD AUTO: 5.9 MILLION/UL (ref 3.88–5.62)
SODIUM SERPL-SCNC: 139 MMOL/L (ref 136–145)
WBC # BLD AUTO: 7.05 THOUSAND/UL (ref 4.31–10.16)

## 2020-07-10 PROCEDURE — 85025 COMPLETE CBC W/AUTO DIFF WBC: CPT | Performed by: INTERNAL MEDICINE

## 2020-07-10 PROCEDURE — 80048 BASIC METABOLIC PNL TOTAL CA: CPT | Performed by: INTERNAL MEDICINE

## 2020-07-10 PROCEDURE — C1894 INTRO/SHEATH, NON-LASER: HCPCS | Performed by: PHYSICIAN ASSISTANT

## 2020-07-10 PROCEDURE — 99152 MOD SED SAME PHYS/QHP 5/>YRS: CPT | Performed by: INTERNAL MEDICINE

## 2020-07-10 PROCEDURE — B2111ZZ FLUOROSCOPY OF MULTIPLE CORONARY ARTERIES USING LOW OSMOLAR CONTRAST: ICD-10-PCS | Performed by: INTERNAL MEDICINE

## 2020-07-10 PROCEDURE — 99152 MOD SED SAME PHYS/QHP 5/>YRS: CPT | Performed by: PHYSICIAN ASSISTANT

## 2020-07-10 PROCEDURE — C1769 GUIDE WIRE: HCPCS | Performed by: PHYSICIAN ASSISTANT

## 2020-07-10 PROCEDURE — 99232 SBSQ HOSP IP/OBS MODERATE 35: CPT | Performed by: INTERNAL MEDICINE

## 2020-07-10 PROCEDURE — B2151ZZ FLUOROSCOPY OF LEFT HEART USING LOW OSMOLAR CONTRAST: ICD-10-PCS | Performed by: INTERNAL MEDICINE

## 2020-07-10 PROCEDURE — 4A023N7 MEASUREMENT OF CARDIAC SAMPLING AND PRESSURE, LEFT HEART, PERCUTANEOUS APPROACH: ICD-10-PCS | Performed by: INTERNAL MEDICINE

## 2020-07-10 PROCEDURE — 99153 MOD SED SAME PHYS/QHP EA: CPT | Performed by: PHYSICIAN ASSISTANT

## 2020-07-10 PROCEDURE — 99233 SBSQ HOSP IP/OBS HIGH 50: CPT | Performed by: INTERNAL MEDICINE

## 2020-07-10 PROCEDURE — 93458 L HRT ARTERY/VENTRICLE ANGIO: CPT | Performed by: INTERNAL MEDICINE

## 2020-07-10 PROCEDURE — 93458 L HRT ARTERY/VENTRICLE ANGIO: CPT | Performed by: PHYSICIAN ASSISTANT

## 2020-07-10 RX ORDER — ONDANSETRON 2 MG/ML
4 INJECTION INTRAMUSCULAR; INTRAVENOUS EVERY 6 HOURS PRN
Status: DISCONTINUED | OUTPATIENT
Start: 2020-07-10 | End: 2020-07-11 | Stop reason: HOSPADM

## 2020-07-10 RX ORDER — CARVEDILOL 12.5 MG/1
25 TABLET ORAL 2 TIMES DAILY WITH MEALS
Status: DISCONTINUED | OUTPATIENT
Start: 2020-07-11 | End: 2020-07-11

## 2020-07-10 RX ORDER — VERAPAMIL HCL 2.5 MG/ML
AMPUL (ML) INTRAVENOUS CODE/TRAUMA/SEDATION MEDICATION
Status: COMPLETED | OUTPATIENT
Start: 2020-07-10 | End: 2020-07-10

## 2020-07-10 RX ORDER — HEPARIN SODIUM 1000 [USP'U]/ML
INJECTION, SOLUTION INTRAVENOUS; SUBCUTANEOUS CODE/TRAUMA/SEDATION MEDICATION
Status: COMPLETED | OUTPATIENT
Start: 2020-07-10 | End: 2020-07-10

## 2020-07-10 RX ORDER — FENTANYL CITRATE 50 UG/ML
INJECTION, SOLUTION INTRAMUSCULAR; INTRAVENOUS CODE/TRAUMA/SEDATION MEDICATION
Status: COMPLETED | OUTPATIENT
Start: 2020-07-10 | End: 2020-07-10

## 2020-07-10 RX ORDER — FUROSEMIDE 10 MG/ML
40 INJECTION INTRAMUSCULAR; INTRAVENOUS DAILY
Status: DISCONTINUED | OUTPATIENT
Start: 2020-07-11 | End: 2020-07-10

## 2020-07-10 RX ORDER — NITROGLYCERIN 20 MG/100ML
INJECTION INTRAVENOUS CODE/TRAUMA/SEDATION MEDICATION
Status: COMPLETED | OUTPATIENT
Start: 2020-07-10 | End: 2020-07-10

## 2020-07-10 RX ORDER — LIDOCAINE WITH 8.4% SOD BICARB 0.9%(10ML)
SYRINGE (ML) INJECTION CODE/TRAUMA/SEDATION MEDICATION
Status: COMPLETED | OUTPATIENT
Start: 2020-07-10 | End: 2020-07-10

## 2020-07-10 RX ORDER — ASPIRIN 81 MG/1
81 TABLET, CHEWABLE ORAL DAILY
Status: DISCONTINUED | OUTPATIENT
Start: 2020-07-11 | End: 2020-07-11 | Stop reason: HOSPADM

## 2020-07-10 RX ORDER — HYDRALAZINE HYDROCHLORIDE 20 MG/ML
INJECTION INTRAMUSCULAR; INTRAVENOUS CODE/TRAUMA/SEDATION MEDICATION
Status: COMPLETED | OUTPATIENT
Start: 2020-07-10 | End: 2020-07-10

## 2020-07-10 RX ORDER — MIDAZOLAM HYDROCHLORIDE 2 MG/2ML
INJECTION, SOLUTION INTRAMUSCULAR; INTRAVENOUS CODE/TRAUMA/SEDATION MEDICATION
Status: COMPLETED | OUTPATIENT
Start: 2020-07-10 | End: 2020-07-10

## 2020-07-10 RX ADMIN — FENTANYL CITRATE 50 MCG: 50 INJECTION, SOLUTION INTRAMUSCULAR; INTRAVENOUS at 17:18

## 2020-07-10 RX ADMIN — HYDRALAZINE HYDROCHLORIDE 10 MG: 20 INJECTION INTRAMUSCULAR; INTRAVENOUS at 17:28

## 2020-07-10 RX ADMIN — ATORVASTATIN CALCIUM 40 MG: 40 TABLET, FILM COATED ORAL at 18:12

## 2020-07-10 RX ADMIN — MIDAZOLAM HYDROCHLORIDE 1 MG: 1 INJECTION, SOLUTION INTRAMUSCULAR; INTRAVENOUS at 17:18

## 2020-07-10 RX ADMIN — HEPARIN SODIUM 5000 UNITS: 1000 INJECTION INTRAVENOUS; SUBCUTANEOUS at 17:26

## 2020-07-10 RX ADMIN — HYDRALAZINE HYDROCHLORIDE 10 MG: 20 INJECTION INTRAMUSCULAR; INTRAVENOUS at 01:13

## 2020-07-10 RX ADMIN — HYDRALAZINE HYDROCHLORIDE 25 MG: 25 TABLET ORAL at 14:57

## 2020-07-10 RX ADMIN — MIDAZOLAM HYDROCHLORIDE 1 MG: 1 INJECTION, SOLUTION INTRAMUSCULAR; INTRAVENOUS at 17:27

## 2020-07-10 RX ADMIN — CARVEDILOL 12.5 MG: 12.5 TABLET, FILM COATED ORAL at 08:46

## 2020-07-10 RX ADMIN — ISOSORBIDE DINITRATE 10 MG: 10 TABLET ORAL at 08:45

## 2020-07-10 RX ADMIN — IODIXANOL 50 ML: 320 INJECTION, SOLUTION INTRAVASCULAR at 17:35

## 2020-07-10 RX ADMIN — ISOSORBIDE DINITRATE 10 MG: 10 TABLET ORAL at 18:13

## 2020-07-10 RX ADMIN — HYDRALAZINE HYDROCHLORIDE 25 MG: 25 TABLET ORAL at 21:33

## 2020-07-10 RX ADMIN — ACETYLCYSTEINE 600 MG: 200 SOLUTION ORAL; RESPIRATORY (INHALATION) at 08:43

## 2020-07-10 RX ADMIN — HYDRALAZINE HYDROCHLORIDE 10 MG: 20 INJECTION INTRAMUSCULAR; INTRAVENOUS at 13:24

## 2020-07-10 RX ADMIN — Medication 2 ML: at 17:21

## 2020-07-10 RX ADMIN — NITROGLYCERIN 200 MCG: 20 INJECTION INTRAVENOUS at 17:24

## 2020-07-10 RX ADMIN — ISOSORBIDE DINITRATE 10 MG: 10 TABLET ORAL at 13:07

## 2020-07-10 RX ADMIN — VERAPAMIL HYDROCHLORIDE 2.5 MG: 2.5 INJECTION, SOLUTION INTRAVENOUS at 17:24

## 2020-07-10 RX ADMIN — ACETYLCYSTEINE 600 MG: 200 SOLUTION ORAL; RESPIRATORY (INHALATION) at 18:14

## 2020-07-10 RX ADMIN — HYDRALAZINE HYDROCHLORIDE 25 MG: 25 TABLET ORAL at 05:44

## 2020-07-10 RX ADMIN — CARVEDILOL 12.5 MG: 12.5 TABLET, FILM COATED ORAL at 18:11

## 2020-07-10 NOTE — ASSESSMENT & PLAN NOTE
A1C 5 4  Patient currently on torsemide 40 mg OD, 1st dose (07/09/2020)  Torsemide being held until catheterization

## 2020-07-10 NOTE — ASSESSMENT & PLAN NOTE
PTH elevated  Vitamin-D low  Patient started on 70214 units of ergocalciferol every 7 days, per nephro  First dose 07/09/2020

## 2020-07-10 NOTE — PLAN OF CARE
Problem: SAFETY ADULT  Goal: Maintain or return to baseline ADL function  Description  INTERVENTIONS:  -  Assess patient's ability to carry out ADLs; assess patient's baseline for ADL function and identify physical deficits which impact ability to perform ADLs (bathing, care of mouth/teeth, toileting, grooming, dressing, etc )  - Assess/evaluate cause of self-care deficits   - Assess range of motion  - Assess patient's mobility; develop plan if impaired  - Assess patient's need for assistive devices and provide as appropriate  - Encourage maximum independence but intervene and supervise when necessary  - Involve family in performance of ADLs  - Assess for home care needs following discharge   - Consider OT consult to assist with ADL evaluation and planning for discharge  - Provide patient education as appropriate  Outcome: Progressing     Problem: CARDIOVASCULAR - ADULT  Goal: Maintains optimal cardiac output and hemodynamic stability  Description  INTERVENTIONS:  - Monitor I/O, vital signs and rhythm  - Monitor for S/S and trends of decreased cardiac output  - Administer and titrate ordered vasoactive medications to optimize hemodynamic stability  - Assess quality of pulses, skin color and temperature  - Assess for signs of decreased coronary artery perfusion  - Instruct patient to report change in severity of symptoms  Outcome: Progressing     Problem: CARDIOVASCULAR - ADULT  Goal: Absence of cardiac dysrhythmias or at baseline rhythm  Description  INTERVENTIONS:  - Continuous cardiac monitoring, vital signs, obtain 12 lead EKG if ordered  - Administer antiarrhythmic and heart rate control medications as ordered  - Monitor electrolytes and administer replacement therapy as ordered  Outcome: Progressing     Problem: METABOLIC, FLUID AND ELECTROLYTES - ADULT  Goal: Electrolytes maintained within normal limits  Description  INTERVENTIONS:  - Monitor labs and assess patient for signs and symptoms of electrolyte imbalances  - Administer electrolyte replacement as ordered  - Monitor response to electrolyte replacements, including repeat lab results as appropriate  - Instruct patient on fluid and nutrition as appropriate  Outcome: Progressing     Problem: METABOLIC, FLUID AND ELECTROLYTES - ADULT  Goal: Fluid balance maintained  Description  INTERVENTIONS:  - Monitor labs   - Monitor I/O and WT  - Instruct patient on fluid and nutrition as appropriate  - Assess for signs & symptoms of volume excess or deficit  Outcome: Progressing

## 2020-07-10 NOTE — SOCIAL WORK
Per JOSHUAIM rounds, pt is to receive a cardiac cath today  CM reiterated that pt does not have insurance  Will need to discuss medications and affordability with SLIM and pt

## 2020-07-10 NOTE — PROGRESS NOTES
Cardiology Progress Note - Mike Britt 44 y o  male MRN: [id number]    Unit/Bed#: -01 Encounter: [id number]        Assessment   1  Acute diastolic heart failure  - symptoms improved, DVT ruled out   - ECHO- EF 46%, grade 2 diastolic dysfunction  - W5P- 5 9  2  Right leg Cellulitis- on IV antibitotics   3  Hypertension- ypertension has improved, systolic in 268-745G  4  SUMI vs CKD due to uncontrolled hypertension      -Baseline Cr is unknown, Cr trending up 1 8   5  Morbidity  obesity- weight loss recommended  6  Hyperlipidemia - CHO- 209, TG-129, HDL- 33,        Plan  Patient is going for cardiac catheterization today  Continue torsemide 40 per day  Continue corge, lipitor   Continue hydralazine 25 mg and isosorbide dinitrate 10 mg TID  patient also needs workup for sleep apnea, check nocturnal pulse oximetry  Nephrology on board     Subjective:   Patient seen and examined  Patient resting in bed comfortably  No shortness breath chest pain, lower extremity swelling is improving  No significant events overnight  Objective:     Vitals: Blood pressure (!) 173/104, pulse 72, temperature 98 3 °F (36 8 °C), temperature source Oral, resp  rate 16, height 5' 11" (1 803 m), weight (!) 165 kg (364 lb 13 8 oz), SpO2 97 %  , Body mass index is 50 89 kg/m² ,   Orthostatic Blood Pressures      Most Recent Value   Blood Pressure  (!) 173/104 filed at 07/10/2020 1300   Patient Position - Orthostatic VS  Sitting filed at 07/10/2020 0750            Intake/Output Summary (Last 24 hours) at 7/10/2020 1440  Last data filed at 7/10/2020 3008  Gross per 24 hour   Intake 360 ml   Output 625 ml   Net -265 ml         Physical Exam:    GEN: Mike Britt appears well, alert and oriented x 3, pleasant and cooperative   HEENT: pupils equal, round, and reactive to light; extraocular muscles intact  NECK: supple, no carotid bruits   HEART: regular rhythm, normal S1 and S2, no murmurs, clicks, gallops or rubs   LUNGS: decreased breath sounds, clear   ABDOMEN: normal bowel sounds, soft, no tenderness, no distention  EXTREMITIES: peripheral pulses normal; no clubbing, cyanosis, 3+ edema   Medications:      Current Facility-Administered Medications:     acetaminophen (TYLENOL) tablet 650 mg, 650 mg, Oral, Q6H PRN, Bard Trevor DO    acetylcysteine (MUCOMYST) 200 mg/mL oral solution 600 mg, 600 mg, Oral, BID, Fe Ruff MD, 600 mg at 07/10/20 0843    atorvastatin (LIPITOR) tablet 40 mg, 40 mg, Oral, Daily With Yolanda Salter MD, 40 mg at 07/09/20 1725    carvedilol (COREG) tablet 12 5 mg, 12 5 mg, Oral, BID With Meals, Rosalva Sanchez MD, 12 5 mg at 07/10/20 0846    enoxaparin (LOVENOX) subcutaneous injection 60 mg, 60 mg, Subcutaneous, BID, Bard Trevor DO, Stopped at 07/10/20 0846    ergocalciferol (VITAMIN D2) capsule 50,000 Units, 50,000 Units, Oral, Weekly, Rosalva Sanchez MD, 50,000 Units at 07/09/20 1307    hydrALAZINE (APRESOLINE) injection 10 mg, 10 mg, Intravenous, Q6H PRN, Nydia Leiva MD, 10 mg at 07/10/20 1324    hydrALAZINE (APRESOLINE) tablet 25 mg, 25 mg, Oral, Q8H [address], Elba Lockett MD, 25 mg at 07/10/20 0544    isosorbide dinitrate (ISORDIL) tablet 10 mg, 10 mg, Oral, TID after meals, Elba Lockett MD, 10 mg at 07/10/20 1307    melatonin tablet 9 mg, 9 mg, Oral, HS PRN, Fe Ruff MD     Labs & Results:    Results from last 7 days   Lab Units 07/08/20  0412 07/07/20  2150 07/07/20  0504   TROPONIN I ng/mL 0 04 0 06* 0 05*     Results from last 7 days   Lab Units 07/10/20  0723 07/09/20  0550 07/08/20   WBC Thousand/uL 7 05 7 95 8 72   HEMOGLOBIN g/dL 15 6 15 0 15 1   HEMATOCRIT % 50 7* 48 7 48 4   PLATELETS Thousands/uL 218 208 200     Results from last 7 days   Lab Units 07/08/20   TRIGLYCERIDES mg/dL 129   HDL mg/dL 33*     Results from last 7 days   Lab Units 07/10/20  0723 07/09/20  0550 07/08/20 07/06/20  1358   POTASSIUM mmol/L 4 0 3 9 3 9   < > 5 1   CHLORIDE mmol/L 103 105 105   < > 107   CO2 mmol/L 28 31 31   < > 29   BUN mg/dL 22 26* 24   < > 24   CREATININE mg/dL 1 84* 2 02* 2 03*   < > 1 98*   CALCIUM mg/dL 8 6 8 4 8 9   < > 8 7   ALK PHOS U/L  --   --   --   --  112   ALT U/L  --   --   --   --  31   AST U/L  --   --   --   --  37    < > = values in this interval not displayed       Results from last 7 days   Lab Units 07/06/20  1358   INR  1 10   PTT seconds 35     Results from last 7 days   Lab Units 07/07/20  0504   MAGNESIUM mg/dL 2 1

## 2020-07-10 NOTE — PLAN OF CARE
Problem: Potential for Falls  Goal: Patient will remain free of falls  Description  INTERVENTIONS:  - Assess patient frequently for physical needs  -  Identify cognitive and physical deficits and behaviors that affect risk of falls    -  [place] fall precautions as indicated by assessment   - Educate patient/family on patient safety including physical limitations  - Instruct patient to call for assistance with activity based on assessment  - Modify environment to reduce risk of injury  - Consider OT/PT consult to assist with strengthening/mobility  Outcome: Progressing     Problem: PAIN - ADULT  Goal: Verbalizes/displays adequate comfort level or baseline comfort level  Description  Interventions:  - Encourage patient to monitor pain and request assistance  - Assess pain using appropriate pain scale  - Administer analgesics based on type and severity of pain and evaluate response  - Implement non-pharmacological measures as appropriate and evaluate response  - Consider cultural and social influences on pain and pain management  - Notify physician/advanced practitioner if interventions unsuccessful or patient reports new pain  Outcome: Progressing     Problem: INFECTION - ADULT  Goal: Absence or prevention of progression during hospitalization  Description  INTERVENTIONS:  - Assess and monitor for signs and symptoms of infection  - Monitor lab/diagnostic results  - Monitor all insertion sites, i e  indwelling lines, tubes, and drains  - Monitor endotracheal if appropriate and nasal secretions for changes in amount and color  - [place] appropriate cooling/warming therapies per order  - Administer medications as ordered  - Instruct and encourage patient and family to use good hand hygiene technique  - Identify and instruct in appropriate isolation precautions for identified infection/condition  Outcome: Progressing  Goal: Absence of fever/infection during neutropenic period  Description  INTERVENTIONS:  - Monitor WBC    Outcome: Progressing     Problem: SAFETY ADULT  Goal: Maintain or return to baseline ADL function  Description  INTERVENTIONS:  -  Assess patient's ability to carry out ADLs; assess patient's baseline for ADL function and identify physical deficits which impact ability to perform ADLs (bathing, care of mouth/teeth, toileting, grooming, dressing, etc )  - Assess/evaluate cause of self-care deficits   - Assess range of motion  - Assess patient's mobility; develop plan if impaired  - Assess patient's need for assistive devices and provide as appropriate  - Encourage maximum independence but intervene and supervise when necessary  - Involve family in performance of ADLs  - Assess for home care needs following discharge   - Consider OT consult to assist with ADL evaluation and planning for discharge  - Provide patient education as appropriate  Outcome: Progressing  Goal: Maintain or return mobility status to optimal level  Description  INTERVENTIONS:  - Assess patient's baseline mobility status (ambulation, transfers, stairs, etc )    - Identify cognitive and physical deficits and behaviors that affect mobility  - Identify mobility aids required to assist with transfers and/or ambulation (gait belt, sit-to-stand, lift, walker, cane, etc )  - [place] fall precautions as indicated by assessment  - Record patient progress and toleration of activity level on Mobility SBAR; progress patient to next Phase/Stage  - Instruct patient to call for assistance with activity based on assessment  - Consider rehabilitation consult to assist with strengthening/weightbearing, etc   Outcome: Progressing     Problem: DISCHARGE PLANNING  Goal: Discharge to home or other facility with appropriate resources  Description  INTERVENTIONS:  - Identify barriers to discharge w/patient and caregiver  - Arrange for needed discharge resources and transportation as appropriate  - Identify discharge learning needs (meds, wound care, etc )  - Arrange for interpretive services to assist at discharge as needed  - Refer to Case Management Department for coordinating discharge planning if the patient needs post-hospital services based on physician/advanced practitioner order or complex needs related to functional status, cognitive ability, or social support system  Outcome: Progressing     Problem: Knowledge Deficit  Goal: Patient/family/caregiver demonstrates understanding of disease process, treatment plan, medications, and discharge instructions  Description  Complete learning assessment and assess knowledge base    Interventions:  - Provide teaching at level of understanding  - Provide teaching via preferred learning methods  Outcome: Progressing     Problem: CARDIOVASCULAR - ADULT  Goal: Maintains optimal cardiac output and hemodynamic stability  Description  INTERVENTIONS:  - Monitor I/O, vital signs and rhythm  - Monitor for S/S and trends of decreased cardiac output  - Administer and titrate ordered vasoactive medications to optimize hemodynamic stability  - Assess quality of pulses, skin color and temperature  - Assess for signs of decreased coronary artery perfusion  - Instruct patient to report change in severity of symptoms  Outcome: Progressing  Goal: Absence of cardiac dysrhythmias or at baseline rhythm  Description  INTERVENTIONS:  - Continuous cardiac monitoring, vital signs, obtain 12 lead EKG if ordered  - Administer antiarrhythmic and heart rate control medications as ordered  - Monitor electrolytes and administer replacement therapy as ordered  Outcome: Progressing     Problem: METABOLIC, FLUID AND ELECTROLYTES - ADULT  Goal: Electrolytes maintained within normal limits  Description  INTERVENTIONS:  - Monitor labs and assess patient for signs and symptoms of electrolyte imbalances  - Administer electrolyte replacement as ordered  - Monitor response to electrolyte replacements, including repeat lab results as appropriate  - Instruct patient on fluid and nutrition as appropriate  Outcome: Progressing  Goal: Fluid balance maintained  Description  INTERVENTIONS:  - Monitor labs   - Monitor I/O and WT  - Instruct patient on fluid and nutrition as appropriate  - Assess for signs & symptoms of volume excess or deficit  Outcome: Progressing

## 2020-07-10 NOTE — ASSESSMENT & PLAN NOTE
Patient chao presented with erythema and tenderness on the R  Leg  He reports that he had blisters on the area that he scratched and ruptured  He reports pain and swelling have gone down since admit  He received rocephin in ER  He received cefazolin 2g three times a day, first dose 7/7/20  Blood cultures at 72 hours show no growth  Patient is afebrile, WBC are not elevated  IV antibiotics are being discontinued (07/09/2020)

## 2020-07-10 NOTE — ASSESSMENT & PLAN NOTE
Patients BP today is 144/86, decreased from admission (231/128)  Lasix discontinued, torsemide 40 mg oral Od started (1st dose 07/09/2020) per cardiology recommendation  Torsemide being held until catheterization  Isordil time 10 mg t i d ,   Coreg 12 5 bid  Hydralazine 25 mg oral t i d  Patient recommended to schedule an outpatient sleep study for JOHN

## 2020-07-10 NOTE — ASSESSMENT & PLAN NOTE
Wt Readings from Last 3 Encounters:   07/10/20 (!) 165 kg (364 lb 13 8 oz)       Patients IV lasix discontinued and amlodipine discontinued  torsemide 40 mg oral od started (first dose 7/9/20), per cardiology  Torsemide being held, scheduled for catheterization  Isordil 10 mg t i d  , Coreg 12 5 mg BD, hydralazine 25 mg oral TID being given for blood pressure  Blood pressure today is 144/86, down from admission (237/124)  Patient diuresing well  Xray chest is normal   Cardio on board  Echo shows left ventricular concentric hypertrophy, grade 2 diastolic dysfunction  Catheterization pending, scheduled for the afternoon of 07/10/2020

## 2020-07-10 NOTE — ASSESSMENT & PLAN NOTE
The 10-year ASCVD risk score (Angélica Latif et al , 2013) is: 9%    Values used to calculate the score:      Age: 44 years      Sex: Male      Is Non- : No      Diabetic: No      Tobacco smoker: No      Systolic Blood Pressure: 132 mmHg      Is BP treated: Yes      HDL Cholesterol: 33 mg/dL      Total Cholesterol: 209 mg/dL     Patients lipid profile shows elevated cholesterol  Placed on Lipitor 40 mg, OD

## 2020-07-10 NOTE — PROGRESS NOTES
Progress Note - Andrew Nielsen 1970, 44 y o  male MRN: [id number]    Unit/Bed#: -01 Encounter: [id number]    Primary Care Provider: Carleton Closs, PA-C   Date and time admitted to hospital: 7/6/2020  1:37 PM        * Acute heart failure [facility])  Assessment & Plan  Wt Readings from Last 3 Encounters:   07/10/20 (!) 165 kg (364 lb 13 8 oz)       Patients IV lasix discontinued and amlodipine discontinued  torsemide 40 mg oral od started (first dose 7/9/20), per cardiology  Torsemide being held, scheduled for catheterization  Isordil 10 mg t i d  , Coreg 12 5 mg BD, hydralazine 25 mg oral TID being given for blood pressure  Blood pressure today is 144/86, down from admission (237/124)  Patient diuresing well  Xray chest is normal   Cardio on board  Echo shows left ventricular concentric hypertrophy, grade 2 diastolic dysfunction  Catheterization pending, scheduled for the afternoon of 07/10/2020  Hypertensive urgency  Assessment & Plan  Patients BP today is 144/86, decreased from admission (231/128)  Lasix discontinued, torsemide 40 mg oral Od started (1st dose 07/09/2020) per cardiology recommendation  Torsemide being held until catheterization  Isordil time 10 mg t i d ,   Coreg 12 5 bid  Hydralazine 25 mg oral t i d  Patient recommended to schedule an outpatient sleep study for JOHN  Cellulitis of left lower extremity  Assessment & Plan  Patient inititially presented with erythema and tenderness on the R  Leg  He reports that he had blisters on the area that he scratched and ruptured  He reports pain and swelling have gone down since admit  He received rocephin in ER  He received cefazolin 2g three times a day, first dose 7/7/20  Blood cultures at 72 hours show no growth  Patient is afebrile, WBC are not elevated  IV antibiotics are being discontinued (07/09/2020)  Elevated serum creatinine  Assessment & Plan  Patients creatnine is trending down, 1 84    BUN is trending down, 22  Lasix discontinued, torsemide 40 mg OD started (1st dose 07/09/2020), per cardio recommendation  Torsemide being held until catheterization  Possible CKD given context of uncontrolled HTN  Patient under went US kidney and bladder, per nephro rec  Normal US results  Secondary hyperparathyroidism of renal origin [facility])  Assessment & Plan  PTH elevated  Vitamin-D low  Patient started on 77071 units of ergocalciferol every 7 days, per nephro  First dose 07/09/2020  Hyperlipidemia  Assessment & Plan  The 10-year ASCVD risk score (Nasir Hdez et al , 2013) is: 9%    Values used to calculate the score:      Age: 44 years      Sex: Male      Is Non- : No      Diabetic: No      Tobacco smoker: No      Systolic Blood Pressure: 976 mmHg      Is BP treated: Yes      HDL Cholesterol: 33 mg/dL      Total Cholesterol: 209 mg/dL     Patients lipid profile shows elevated cholesterol  Placed on Lipitor 40 mg, OD  Insomnia  Assessment & Plan  Patient reports insomnia  Currently being given melatonin 9mg, PRN  Morbid obesity due to excess calories ([facility] [address] )  Assessment & Plan  RD consult, A1C: 5 7  TSH normal    Polyuria  Assessment & Plan  A1C 5 4  Patient currently on torsemide 40 mg OD, 1st dose (07/09/2020)  Torsemide being held until catheterization  VTE Pharmacologic Prophylaxis:   Pharmacologic: Enoxaparin (Lovenox)  Mechanical VTE Prophylaxis in Place: Yes    Discussions with Specialists or Other Care Team Provider:  Cardiology    Education and Discussions with Family / Patient:  Done at bedside with patient and patient's mother    Current Length of Stay: 4 day(s)    Current Patient Status: Inpatient     Discharge Plan / Estimated Discharge Date:  Pending    Code Status: Level 1 - Full Code      Subjective:   Patient reports that he is doing well    He states that his right ankle has significantly improved since the time of admission, there is a decrease in swelling, and he is able to ambulate well  At present, He denies any chest pain, palpitations and shortness of breath  Objective:     Vitals:   Temp (24hrs), Av 2 °F (36 8 °C), Min:98 1 °F (36 7 °C), Max:98 3 °F (36 8 °C)    Temp:  [98 1 °F (36 7 °C)-98 3 °F (36 8 °C)] 98 3 °F (36 8 °C)  HR:  [72-86] 86  Resp:  [16-19] 16  BP: (144-164)/() 146/86  SpO2:  [90 %-97 %] 97 %  Body mass index is 50 89 kg/m²  Input and Output Summary (last 24 hours): Intake/Output Summary (Last 24 hours) at 7/10/2020 1252  Last data filed at 7/10/2020 5717  Gross per 24 hour   Intake 360 ml   Output 625 ml   Net -265 ml       Physical Exam:     Physical Exam   Constitutional: He is oriented to person, place, and time  He appears well-developed and well-nourished  HENT:   Head: Normocephalic and atraumatic  Eyes: EOM are normal    Neck: Normal range of motion  Cardiovascular: Normal rate and regular rhythm  Pulmonary/Chest: Effort normal and breath sounds normal    Musculoskeletal:   Right ankle has significantly improved since the time of admission  Neurological: He is alert and oriented to person, place, and time  Psychiatric: He has a normal mood and affect  His behavior is normal          Additional Data:     Labs:    Results from last 7 days   Lab Units 07/10/20  0723   WBC Thousand/uL 7 05   HEMOGLOBIN g/dL 15 6   HEMATOCRIT % 50 7*   PLATELETS Thousands/uL 218   NEUTROS PCT % 76*   LYMPHS PCT % 13*   MONOS PCT % 9   EOS PCT % 1     Results from last 7 days   Lab Units 07/10/20  0723  20  1358   POTASSIUM mmol/L 4 0   < > 5 1   CHLORIDE mmol/L 103   < > 107   CO2 mmol/L 28   < > 29   BUN mg/dL 22   < > 24   CREATININE mg/dL 1 84*   < > 1 98*   CALCIUM mg/dL 8 6   < > 8 7   ALK PHOS U/L  --   --  112   ALT U/L  --   --  31   AST U/L  --   --  37    < > = values in this interval not displayed       Results from last 7 days   Lab Units 20  1358   INR  1 10       * I Have Reviewed All Lab Data Listed Above  * Additional Pertinent Lab Tests Reviewed: All Labs Within Last 24 Hours Reviewed    Imaging:    Imaging Reports Reviewed Today Include:  None  Imaging Personally Reviewed by Myself Includes:  None    Recent Cultures (last 7 days):     Results from last 7 days   Lab Units 07/06/20  1555 07/06/20  1540   BLOOD CULTURE  No Growth at 72 hrs  No Growth at 72 hrs  Last 24 Hours Medication List:     Current Facility-Administered Medications:  acetaminophen 650 mg Oral Q6H PRN Stephanie Bowman DO   acetylcysteine 600 mg Oral BID Fern Lynn MD   atorvastatin 40 mg Oral Daily With Natalee Puente MD   carvedilol 12 5 mg Oral BID With Meals Tahira Edmond MD   enoxaparin 60 mg Subcutaneous BID Stephanie Bowman DO   ergocalciferol 50,000 Units Oral Weekly Tahira Edmond MD   hydrALAZINE 10 mg Intravenous Q6H PRN Rodrigo Keita MD   hydrALAZINE 25 mg Oral Q8H Rylan Maya MD   isosorbide dinitrate 10 mg Oral TID after meals Kaitlin Alvarenga MD   melatonin 9 mg Oral HS PRN Fern Lynn MD        Today, Patient Was Seen By: Fern Lynn MD    ** Please Note: This note has been constructed using a voice recognition system   **

## 2020-07-10 NOTE — ASSESSMENT & PLAN NOTE
Patients creatnine is trending down, 1 84  BUN is trending down, 22  Lasix discontinued, torsemide 40 mg OD started (1st dose 07/09/2020), per cardio recommendation  Torsemide being held until catheterization  Possible CKD given context of uncontrolled HTN  Patient under went US kidney and bladder, per nephro rec  Normal US results

## 2020-07-11 VITALS
TEMPERATURE: 97.3 F | OXYGEN SATURATION: 95 % | HEIGHT: 71 IN | WEIGHT: 315 LBS | RESPIRATION RATE: 18 BRPM | SYSTOLIC BLOOD PRESSURE: 150 MMHG | HEART RATE: 68 BPM | DIASTOLIC BLOOD PRESSURE: 91 MMHG | BODY MASS INDEX: 44.1 KG/M2

## 2020-07-11 LAB
ANION GAP SERPL CALCULATED.3IONS-SCNC: 9 MMOL/L (ref 4–13)
BACTERIA BLD CULT: NORMAL
BACTERIA BLD CULT: NORMAL
BASOPHILS # BLD AUTO: 0.03 THOUSANDS/ΜL (ref 0–0.1)
BASOPHILS NFR BLD AUTO: 0 % (ref 0–1)
BUN SERPL-MCNC: 22 MG/DL (ref 5–25)
CALCIUM SERPL-MCNC: 8.4 MG/DL (ref 8.3–10.1)
CHLORIDE SERPL-SCNC: 104 MMOL/L (ref 100–108)
CO2 SERPL-SCNC: 28 MMOL/L (ref 21–32)
CREAT SERPL-MCNC: 1.64 MG/DL (ref 0.6–1.3)
EOSINOPHIL # BLD AUTO: 0.09 THOUSAND/ΜL (ref 0–0.61)
EOSINOPHIL NFR BLD AUTO: 1 % (ref 0–6)
ERYTHROCYTE [DISTWIDTH] IN BLOOD BY AUTOMATED COUNT: 15.1 % (ref 11.6–15.1)
GFR SERPL CREATININE-BSD FRML MDRD: 48 ML/MIN/1.73SQ M
GLUCOSE SERPL-MCNC: 96 MG/DL (ref 65–140)
HCT VFR BLD AUTO: 47.8 % (ref 36.5–49.3)
HGB BLD-MCNC: 14.8 G/DL (ref 12–17)
IMM GRANULOCYTES # BLD AUTO: 0.03 THOUSAND/UL (ref 0–0.2)
IMM GRANULOCYTES NFR BLD AUTO: 0 % (ref 0–2)
LYMPHOCYTES # BLD AUTO: 0.97 THOUSANDS/ΜL (ref 0.6–4.47)
LYMPHOCYTES NFR BLD AUTO: 14 % (ref 14–44)
MCH RBC QN AUTO: 26.5 PG (ref 26.8–34.3)
MCHC RBC AUTO-ENTMCNC: 31 G/DL (ref 31.4–37.4)
MCV RBC AUTO: 86 FL (ref 82–98)
MONOCYTES # BLD AUTO: 0.72 THOUSAND/ΜL (ref 0.17–1.22)
MONOCYTES NFR BLD AUTO: 10 % (ref 4–12)
NEUTROPHILS # BLD AUTO: 5.06 THOUSANDS/ΜL (ref 1.85–7.62)
NEUTS SEG NFR BLD AUTO: 75 % (ref 43–75)
NRBC BLD AUTO-RTO: 0 /100 WBCS
PLATELET # BLD AUTO: 205 THOUSANDS/UL (ref 149–390)
PMV BLD AUTO: 11.4 FL (ref 8.9–12.7)
POTASSIUM SERPL-SCNC: 3.8 MMOL/L (ref 3.5–5.3)
RBC # BLD AUTO: 5.58 MILLION/UL (ref 3.88–5.62)
SODIUM SERPL-SCNC: 141 MMOL/L (ref 136–145)
WBC # BLD AUTO: 6.9 THOUSAND/UL (ref 4.31–10.16)

## 2020-07-11 PROCEDURE — 99233 SBSQ HOSP IP/OBS HIGH 50: CPT | Performed by: INTERNAL MEDICINE

## 2020-07-11 PROCEDURE — 94762 N-INVAS EAR/PLS OXIMTRY CONT: CPT

## 2020-07-11 PROCEDURE — 99239 HOSP IP/OBS DSCHRG MGMT >30: CPT | Performed by: INTERNAL MEDICINE

## 2020-07-11 PROCEDURE — 85025 COMPLETE CBC W/AUTO DIFF WBC: CPT | Performed by: INTERNAL MEDICINE

## 2020-07-11 PROCEDURE — 80048 BASIC METABOLIC PNL TOTAL CA: CPT | Performed by: INTERNAL MEDICINE

## 2020-07-11 RX ORDER — CARVEDILOL 12.5 MG/1
12.5 TABLET ORAL 2 TIMES DAILY WITH MEALS
Qty: 60 TABLET | Refills: 0 | Status: SHIPPED | OUTPATIENT
Start: 2020-07-11 | End: 2020-08-17 | Stop reason: SDUPTHER

## 2020-07-11 RX ORDER — ASPIRIN 81 MG/1
81 TABLET, CHEWABLE ORAL DAILY
Qty: 30 TABLET | Refills: 0 | Status: SHIPPED | OUTPATIENT
Start: 2020-07-12 | End: 2020-08-11

## 2020-07-11 RX ORDER — HYDRALAZINE HYDROCHLORIDE 25 MG/1
100 TABLET, FILM COATED ORAL EVERY 8 HOURS SCHEDULED
Status: DISCONTINUED | OUTPATIENT
Start: 2020-07-11 | End: 2020-07-11 | Stop reason: HOSPADM

## 2020-07-11 RX ORDER — CARVEDILOL 12.5 MG/1
12.5 TABLET ORAL 2 TIMES DAILY WITH MEALS
Status: DISCONTINUED | OUTPATIENT
Start: 2020-07-11 | End: 2020-07-11 | Stop reason: HOSPADM

## 2020-07-11 RX ORDER — ATORVASTATIN CALCIUM 40 MG/1
40 TABLET, FILM COATED ORAL
Qty: 30 TABLET | Refills: 0 | Status: SHIPPED | OUTPATIENT
Start: 2020-07-11 | End: 2020-08-17 | Stop reason: SDUPTHER

## 2020-07-11 RX ORDER — FUROSEMIDE 40 MG/1
40 TABLET ORAL DAILY
Qty: 30 TABLET | Refills: 0 | Status: SHIPPED | OUTPATIENT
Start: 2020-07-11 | End: 2020-08-17 | Stop reason: SDUPTHER

## 2020-07-11 RX ORDER — POTASSIUM CHLORIDE 750 MG/1
10 TABLET, EXTENDED RELEASE ORAL DAILY
Status: DISCONTINUED | OUTPATIENT
Start: 2020-07-11 | End: 2020-07-11 | Stop reason: HOSPADM

## 2020-07-11 RX ORDER — POTASSIUM CHLORIDE 750 MG/1
10 TABLET, EXTENDED RELEASE ORAL DAILY
Qty: 30 TABLET | Refills: 0 | Status: SHIPPED | OUTPATIENT
Start: 2020-07-11 | End: 2021-01-11 | Stop reason: SDUPTHER

## 2020-07-11 RX ORDER — FUROSEMIDE 40 MG/1
40 TABLET ORAL DAILY
Status: DISCONTINUED | OUTPATIENT
Start: 2020-07-11 | End: 2020-07-11 | Stop reason: HOSPADM

## 2020-07-11 RX ORDER — HYDRALAZINE HYDROCHLORIDE 100 MG/1
100 TABLET, FILM COATED ORAL EVERY 8 HOURS SCHEDULED
Qty: 90 TABLET | Refills: 0 | Status: SHIPPED | OUTPATIENT
Start: 2020-07-11 | End: 2020-08-10

## 2020-07-11 RX ORDER — ERGOCALCIFEROL 1.25 MG/1
50000 CAPSULE ORAL WEEKLY
Qty: 4 CAPSULE | Refills: 0 | Status: SHIPPED | OUTPATIENT
Start: 2020-07-16 | End: 2020-08-17 | Stop reason: SDUPTHER

## 2020-07-11 RX ADMIN — ENOXAPARIN SODIUM 60 MG: 60 INJECTION SUBCUTANEOUS at 08:25

## 2020-07-11 RX ADMIN — FUROSEMIDE 40 MG: 40 TABLET ORAL at 12:10

## 2020-07-11 RX ADMIN — HYDRALAZINE HYDROCHLORIDE 25 MG: 25 TABLET ORAL at 05:24

## 2020-07-11 RX ADMIN — HYDRALAZINE HYDROCHLORIDE 100 MG: 25 TABLET ORAL at 13:23

## 2020-07-11 RX ADMIN — POTASSIUM CHLORIDE 10 MEQ: 750 TABLET, EXTENDED RELEASE ORAL at 12:10

## 2020-07-11 RX ADMIN — ISOSORBIDE DINITRATE 10 MG: 10 TABLET ORAL at 08:24

## 2020-07-11 RX ADMIN — ASPIRIN 81 MG 81 MG: 81 TABLET ORAL at 08:24

## 2020-07-11 RX ADMIN — ACETYLCYSTEINE 600 MG: 200 SOLUTION ORAL; RESPIRATORY (INHALATION) at 08:23

## 2020-07-11 RX ADMIN — CARVEDILOL 25 MG: 12.5 TABLET, FILM COATED ORAL at 08:24

## 2020-07-11 NOTE — DISCHARGE INSTR - AVS FIRST PAGE
Follow-up with PCP in 1 week  Follow-up with Cardiology and Nephrology as outpatient  BMP in 1 week and follow-up with PCP/Nephrology  Cardiac diet and fluid restriction for diastolics heart failure  Come back to the emergency room if condition worsen or recur

## 2020-07-11 NOTE — SOCIAL WORK
[phone] the [facility] that were sent [facility]  With using the Good Rx Card, the total amt for all the meds was $108 00  CM spoke to pt and he is agreeable to this amt

## 2020-07-11 NOTE — PROGRESS NOTES
NEPHROLOGY PROGRESS NOTE    Patient: Ananda Morales               Sex: male          DOA: 7/6/2020  1:37 PM   Date of Birth: @        Age: @        LOS:  LOS: 5 days   7/11/2020    REASON FOR THE CONSULTATION:  Further management of SUMI  HPI     This is a 44 y o  male admitted for Acute heart failure ([facility] [address] )     SUBJECTIVE     - underwent cardiac catheterization yesterday  Currently breathing is stable and at baseline  Patient denies nausea, vomiting, headache or dizziness today    - Reviewed last 24 hrs events     CURRENT MEDICATIONS       Current Facility-Administered Medications:     acetaminophen (TYLENOL) tablet 650 mg, 650 mg, Oral, Q6H PRN, Valentina Ingram DO    acetylcysteine (MUCOMYST) 200 mg/mL oral solution 600 mg, 600 mg, Oral, BID, Ovidio Traylor MD, 600 mg at 07/11/20 6335    aspirin chewable tablet 81 mg, 81 mg, Oral, Daily, Cristofer Parson MD, 81 mg at 07/11/20 0824    atorvastatin (LIPITOR) tablet 40 mg, 40 mg, Oral, Daily With Kim Christian MD, 40 mg at 07/10/20 1812    carvedilol (COREG) tablet 12 5 mg, 12 5 mg, Oral, BID With Meals, Sj Marino MD  [facility]  [START ON 7/12/2020] enoxaparin (LOVENOX) subcutaneous injection 60 mg, 60 mg, Subcutaneous, Q24H [address], Sj Marino MD    ergocalciferol (VITAMIN D2) capsule 50,000 Units, 50,000 Units, Oral, Weekly, Monica Lassiter MD, 50,000 Units at 07/09/20 1307    furosemide (LASIX) tablet 40 mg, 40 mg, Oral, Daily, Sj Marino MD    hydrALAZINE (APRESOLINE) injection 10 mg, 10 mg, Intravenous, Q6H PRN, Baudilio Christianson MD, 10 mg at 07/10/20 1324    hydrALAZINE (APRESOLINE) tablet 100 mg, 100 mg, Oral, Q8H [address], Sj Marino MD    melatonin tablet 9 mg, 9 mg, Oral, HS PRN, Ovidio Traylor MD    ondansetron (ZOFRAN) injection 4 mg, 4 mg, Intravenous, Q6H PRN, Cristofer Parson MD    potassium chloride (K-DUR,KLOR-CON) CR tablet 10 mEq, 10 mEq, Oral, Daily, Sj Marino MD    OBJECTIVE     Current Weight: Weight - Scale: (!) 164 kg (362 lb)  /91   Pulse 68   Temp (!) 97 3 °F (36 3 °C)   Resp 18   Ht 5' 11" (1 803 m)   Wt (!) 164 kg (362 lb)   SpO2 95%   BMI 50 49 kg/m²   Vitals:    07/11/20 1127   BP: 150/91   Pulse: 68   Resp:    Temp: (!) 97 3 °F (36 3 °C)   SpO2: 95%     Body mass index is 50 49 kg/m²  Intake/Output Summary (Last 24 hours) at 7/11/2020 1205  Last data filed at 7/11/2020 0900  Gross per 24 hour   Intake 540 ml   Output 200 ml   Net 340 ml       PHYSICAL EXAMINATION     Physical Exam   Constitutional: No distress  HENT:   Head: Normocephalic and atraumatic  Eyes: No scleral icterus  Neck: Neck supple  No JVD present  Cardiovascular: Normal rate  Pulmonary/Chest: No accessory muscle usage  No respiratory distress  He has no wheezes  Abdominal: Soft  He exhibits no distension  Musculoskeletal: He exhibits edema  Right hand: He exhibits no laceration  Left hand: He exhibits no laceration  Neurological: He is alert  Skin: Skin is warm  No cyanosis  Psychiatric: He is not combative  He is communicative           LAB RESULTS     Results from last 7 days   Lab Units 07/11/20  0553 07/10/20  0723 07/09/20  0550 07/08/20 07/07/20  0504 07/06/20  1358   WBC Thousand/uL 6 90 7 05 7 95 8 72 8 15 9 03   HEMOGLOBIN g/dL 14 8 15 6 15 0 15 1 14 2 15 4   HEMATOCRIT % 47 8 50 7* 48 7 48 4 46 1 50 4*   PLATELETS Thousands/uL 205 218 208 200 189 202   SODIUM mmol/L 141 139 142 143 141 141   POTASSIUM mmol/L 3 8 4 0 3 9 3 9 3 9 5 1   CHLORIDE mmol/L 104 103 105 105 107 107   CO2 mmol/L 28 28 31 31 27 29   BUN mg/dL 22 22 26* 24 24 24   CREATININE mg/dL 1 64* 1 84* 2 02* 2 03* 1 82* 1 98*   EGFR ml/min/1 73sq m 48 42 37 37 42 38   CALCIUM mg/dL 8 4 8 6 8 4 8 9 8 2* 8 7   MAGNESIUM mg/dL  --   --   --   --  2 1  --    PHOSPHORUS mg/dL  --   --   --   --  3 9  --        I have personally reviewed the old medical records and patient's previously known baseline creatinine level is unknown    RADIOLOGY RESULTS      US kidney and bladder   Final Result by Manuel Molina MD (07/07 7034)      Normal renal sonogram           Workstation performed: [id number]         CTA ED chest PE study   Final Result by Jaime Rios MD (07/06 1648)      No evidence for pulmonary embolism  Mild pulmonary vascular congestion  Small pericardial effusion  Workstation performed: [id number]         VAS lower limb venous duplex study, unilateral/limited   Final Result by Shimon Solis DO (07/07 0935)      XR chest 1 view portable   Final Result by Jeremy England MD (07/06 1415)      No acute cardiopulmonary disease  Workstation performed: [id number]             PLAN / RECOMMENDATIONS      1  SUMI  Present on admission  Upon review of old medical records, previously known baseline creatinine level is unknown  Patient had underwent cardiac catheterization yesterday  Overnight remained nonoliguric and renal function has also improved to current creatinine of 1 64  Plan to resume Lasix at 40 mg PO daily today  Monitor renal function while in the hospital     2  Hypertension  Essential although patient may have underlying chronic kidney disease  Overnight blood pressure has remained under good control and monitor hypertension with Coreg 12 5 mg PO BID, hydralazine 25 mg PO TID and isosorbide 10 mg PO TID  3  Vitamin-D deficiency  Found to have low vitamin-D level of 13 with elevated PTH level of 198 and was started on ergocalciferol  Recommend 12 week course of ergocalciferol and need to have vitamin-D level recheck after 3 months as outpatient  Disposition:  Stable from renal standpoint for discharge  Please schedule outpatient Nephrology follow-up with us in 2 weeks      Overall above mentioned plan was also d/w Sabas Fortune MD  Nephrology  7/11/2020        Portions of the record may have been created with voice recognition software  Occasional wrong word or "sound a like" substitutions may have occurred due to the inherent limitations of voice recognition software  Read the chart carefully and recognize, using context, where substitutions have occurred

## 2020-07-11 NOTE — DISCHARGE INSTRUCTIONS
Heart Failure   WHAT YOU NEED TO KNOW:   Heart failure (HF) is a condition that does not allow your heart to fill or pump properly  Not enough oxygen in your blood gets to your organs and tissues  HF can occur in the right side, the left side, or both lower chambers of your heart  HF is often caused by damage or injury to your heart  The damage may be caused by heart attack, other heart conditions, or high blood pressure  HF is a long-term condition that tends to get worse over time  It is important to manage your health to improve your quality of life  HF can be worsened by heavy alcohol use, smoking, diabetes that is not controlled, or obesity  DISCHARGE INSTRUCTIONS:   Call 911 if:   · You have any of the following signs of a heart attack:      ¨ Squeezing, pressure, or pain in your chest that lasts longer than 5 minutes or returns    ¨ Discomfort or pain in your back, neck, jaw, stomach, or arm     ¨ Trouble breathing    ¨ Nausea or vomiting    ¨ Lightheadedness or a sudden cold sweat, especially with chest pain or trouble breathing    Return to the emergency department if:   · You gain 3 or more pounds (1 4 kg) in a day, or more than your healthcare provider says you should  · Your heartbeat is fast, slow, or uneven all the time  Contact your healthcare provider if:   · You have symptoms of worsening HF:      ¨ Shortness of breath at rest, at night, or that is getting worse in any way     ¨ Weight gain of 5 or more pounds (2 2 kg) in a week     ¨ More swelling in your legs or ankles     ¨ Abdominal pain or swelling     ¨ More coughing     ¨ Loss of appetite     ¨ Feeling tired all the time    · You feel hopeless or depressed, or you have lost interest in things you used to enjoy  · You often feel worried or afraid  · You have questions or concerns about your condition or care  Medicines: You may  need any of the following:  · Medicines  may be needed to help regulate your heart rhythm   You may also need medicine to lower your blood pressure, and to get rid of extra fluids  · Take your medicine as directed  Contact your healthcare provider if you think your medicine is not helping or if you have side effects  Tell him of her if you are allergic to any medicine  Keep a list of the medicines, vitamins, and herbs you take  Include the amounts, and when and why you take them  Bring the list or the pill bottles to follow-up visits  Carry your medicine list with you in case of an emergency  Follow up with your healthcare provider or cardiologist within 2 weeks or as directed: You may need to return for other tests  You may need home health care  A healthcare provider will monitor your vital signs, weight, and make sure your medicines are working  Write down your questions so you remember to ask them during your visits  Go to cardiac rehab as directed:  Cardiac rehab is a program run by specialists who will help you safely strengthen your heart  The program includes exercise, relaxation, stress management, and heart-healthy nutrition  Healthcare providers will also make sure your medicines are helping to reduce your symptoms  Manage HF:   · Do not smoke  Nicotine and other chemicals in cigarettes and cigars can cause lung damage and make HF difficult to manage  Ask your healthcare provider for information if you currently smoke and need help to quit  E-cigarettes or smokeless tobacco still contain nicotine  Talk to your healthcare provider before you use these products  · Do not drink alcohol or take illegal drugs  Alcohol and drugs can worsen your symptoms quickly  · Weigh yourself every morning  Use the same scale, in the same spot  Do this after you use the bathroom, but before you eat or drink anything  Wear the same type of clothing  Do not wear shoes  Record your weight each day so you will notice any sudden weight gain  Swelling and weight gain are signs of fluid retention   If you are overweight, ask how to lose weight safely  · Check your blood pressure and heart rate every day  Ask for more information about how to measure your blood pressure and heart rate correctly  Ask what these numbers should be for you  · Manage any chronic health conditions you have  These include high blood pressure, diabetes, obesity, high cholesterol, metabolic syndrome, and COPD  You will have fewer symptoms if you manage these health conditions  Follow your healthcare provider's recommendations and follow up with him or her regularly  · Eat heart-healthy foods and limit sodium (salt)  An easy way to do this is to eat more fresh fruits and vegetables and fewer canned and processed foods  Replace butter and margarine with heart-healthy oils such as olive oil and canola oil  Other heart-healthy foods include walnuts, whole-grain breads, low-fat dairy products, beans, and lean meats  Fatty fish such as salmon and tuna are also heart healthy  Ask how much salt you can eat each day  Do not use salt substitutes  · Drink liquids as directed  You may need to limit the amount of liquids you drink if you retain fluid  Ask how much liquid to drink each day and which liquids are best for you  · Stay active  If you are not active, your symptoms are likely to worsen quickly  Walking, bicycling, and other types of physical activity help maintain your strength and improve your mood  Physical activity also helps you manage your weight  Work with your healthcare provider to create an exercise plan that is right for you  · Get vaccines as directed  Get a flu shot every year  You may also need the pneumonia vaccine  The flu and pneumonia can be severe for a person who has HF  Vaccines protect you from these infections  Join a support group:  Living with HF can be difficult  It may be helpful to talk with others who have HF   You may learn how to better manage your condition or get emotional support  For more information:   · [address]  [place] , [place]   Phone: 7- [phone]  Web Address: https://[employer] com/  [employer]   © 2017 [employer] Information is for End User's use only and may not be sold, redistributed or otherwise used for commercial purposes  All illustrations and images included in CareNotes® are the copyrighted property of [employer]  or [employer]  The above information is an  only  It is not intended as medical advice for individual conditions or treatments  Talk to your doctor, nurse or pharmacist before following any medical regimen to see if it is safe and effective for you  Hypertension   WHAT YOU NEED TO KNOW:   Hypertension is high blood pressure (BP)  Your BP is the force of your blood moving against the walls of your arteries  Normal BP is less than 120/80  Prehypertension is between 120/80 and 139/89  Hypertension is 140/90 or higher  Hypertension causes your BP to get so high that your heart has to work much harder than normal  This can damage your heart  You can control hypertension with a healthy lifestyle or medicines  A controlled blood pressure helps protect your organs, such as your heart, lungs, brain, and kidneys  DISCHARGE INSTRUCTIONS:   Call 911 for any of the following:   · You have discomfort in your chest that feels like squeezing, pressure, fullness, or pain  · You become confused or have difficulty speaking  · You suddenly feel lightheaded or have trouble breathing  · You have pain or discomfort in your back, neck, jaw, stomach, or arm  Return to the emergency department if:   · You have a severe headache or vision loss  · You have weakness in an arm or leg  Contact your healthcare provider if:   · You feel faint, dizzy, confused, or drowsy       · You have been taking your BP medicine and your BP is still higher than your healthcare provider says it should be     · You have questions or concerns about your condition or care  Medicines: You may  need any of the following:  · Medicine  may be used to help lower your BP  You may need more than one type of medicine  Take the medicine exactly as directed  · Diuretics  help decrease extra fluid that collects in your body  This will help lower your BP  You may urinate more often while you take this medicine  · Cholesterol medicine  helps lower your cholesterol level  A low cholesterol level helps prevent heart disease and makes it easier to control your blood pressure  · Take your medicine as directed  Contact your healthcare provider if you think your medicine is not helping or if you have side effects  Tell him or her if you are allergic to any medicine  Keep a list of the medicines, vitamins, and herbs you take  Include the amounts, and when and why you take them  Bring the list or the pill bottles to follow-up visits  Carry your medicine list with you in case of an emergency  Follow up with your healthcare provider as directed: You will need to return to have your BP checked and to have other lab tests done  Write down your questions so you remember to ask them during your visits  Manage hypertension:  Talk with your healthcare provider about these and other ways to manage hypertension:  · Check your BP at home  Sit and rest for 5 minutes before you take your BP  Extend your arm and support it on a flat surface  Your arm should be at the same level as your heart  Follow the directions that came with your BP monitor  If possible, take at least 2 BP readings each time  Take your BP at least twice a day at the same times each day, such as morning and evening  Keep a record of your BP readings and bring it to your follow-up visits  Ask your healthcare provider what your BP should be  · Limit sodium (salt) as directed  Too much sodium can affect your fluid balance   Check labels to find low-sodium or no-salt-added foods  Some low-sodium foods use potassium salts for flavor  Too much potassium can also cause health problems  Your healthcare provider will tell you how much sodium and potassium are safe for you to have in a day  He or she may recommend that you limit sodium to 2,300 mg a day  · Follow the meal plan recommended by your healthcare provider  A dietitian or your provider can give you more information on low-sodium plans or the DASH (Dietary Approaches to Stop Hypertension) eating plan  The DASH plan is low in sodium, unhealthy fats, and total fat  It is high in potassium, calcium, and fiber  · Exercise to maintain a healthy weight  Exercise at least 30 minutes per day, on most days of the week  This will help decrease your blood pressure  Ask your healthcare provider about the best exercise plan for you  · Decrease stress  This may help lower your BP  Learn ways to relax, such as deep breathing or listening to music  · Limit alcohol  Women should limit alcohol to 1 drink a day  Men should limit alcohol to 2 drinks a day  A drink of alcohol is 12 ounces of beer, 5 ounces of wine, or 1½ ounces of liquor  · Do not smoke  Nicotine and other chemicals in cigarettes and cigars can increase your BP and also cause lung damage  Ask your healthcare provider for information if you currently smoke and need help to quit  E-cigarettes or smokeless tobacco still contain nicotine  Talk to your healthcare provider before you use these products  · Manage any other health conditions you have  Health conditions such as diabetes can increase your risk for hypertension  Follow your healthcare provider's instructions and take all your medicines as directed  © 2017 [employer] Information is for End User's use only and may not be sold, redistributed or otherwise used for commercial purposes   All illustrations and images included in CareNotes® are the copyrighted property of [employer]  or [employer] Sanchez  The above information is an  only  It is not intended as medical advice for individual conditions or treatments  Talk to your doctor, nurse or pharmacist before following any medical regimen to see if it is safe and effective for you  Chronic Kidney Disease   WHAT YOU NEED TO KNOW:   Chronic kidney disease (CKD) is the gradual and permanent loss of kidney function  It is also called chronic kidney failure, or chronic renal insufficiency  Normally, the kidneys remove fluid, chemicals, and waste from your blood  These wastes are turned into urine by your kidneys  CKD may worsen over time and lead to kidney failure  DISCHARGE INSTRUCTIONS:   Return to the emergency department if:   · You are confused and very drowsy  · You have a seizure  · You have shortness of breath  Contact your healthcare provider if:   · You suddenly gain or lose more weight than your healthcare provider has told you is okay  · You have itchy skin or a rash  · You urinate more or less than you normally do  · You have blood in your urine  · You have nausea and repeated vomiting  · You have fatigue or muscle weakness  · You have hiccups that will not stop  · You have questions or concerns about your condition or care  Medicines:   · Medicines  may be given to decrease blood pressure and get rid of extra fluid  You may also receive medicine to manage health conditions that may occur with CKD, such as anemia, diabetes, and heart disease  · Take your medicine as directed  Contact your healthcare provider if you think your medicine is not helping or if you have side effects  Tell him or her if you are allergic to any medicine  Keep a list of the medicines, vitamins, and herbs you take  Include the amounts, and when and why you take them  Bring the list or the pill bottles to follow-up visits   Carry your medicine list with you in case of an emergency  Follow up with your healthcare provider as directed: You will need to return for tests to monitor your kidney function  You may also be referred to a kidney specialist  Write down your questions so you remember to ask them during your visits  Manage other health conditions: Follow your healthcare provider's directions on how to manage diabetes, high blood pressure, and heart disease  These conditions can make CKD worse  Talk to your healthcare provider before you take over-the-counter medicine  Medicines such as NSAIDs, stomach medicine, or laxatives may harm your kidneys  Weigh yourself daily:  Ask your healthcare provider what your weight should be  Ask how much liquid you should drink each day  CKD may cause you to gain or lose weight rapidly  Weigh yourself every day  Write down your weight, how much liquid you drink or eat, and how much you urinate each day  Contact your healthcare provider if your weight is higher or lower than it should be  Manage CKD:   · Maintain a healthy weight  Ask your healthcare provider how much you should weigh  Ask him to help you create a weight loss plan if you are overweight  · Exercise 30 to 60 minutes a day, 4 to 7 times a week, or as directed  Ask about the best exercise plan for you  Regular exercise can help you manage CKD, high blood pressure, and diabetes  · Follow your healthcare provider's advice about what to eat and drink  He may tell you to eat food low in sodium (salt), potassium, phosphorus, or protein  You may need to see a dietitian if you need help planning meals  Ask how much liquid to drink each day and which liquids are best for you  · Limit alcohol  Ask how much alcohol is safe for you to drink  A drink of alcohol is 12 ounces of beer, 5 ounces of wine, or 1½ ounces of liquor  · Do not smoke  Nicotine and other chemicals in cigarettes and cigars can cause lung and kidney damage   Ask your healthcare provider for information if you currently smoke and need help to quit  E-cigarettes or smokeless tobacco still contain nicotine  Talk to your healthcare provider before you use these products  · Ask your healthcare provider if you need vaccines  Infections such as pneumonia, influenza, and hepatitis can be more harmful or more likely to occur in a person who has CKD  Vaccines reduce your risk of infection with these viruses  © 2017 [employer] [employer] Information is for End User's use only and may not be sold, redistributed or otherwise used for commercial purposes  All illustrations and images included in CareNotes® are the copyrighted property of [employer]  or Guzman Sanchez  The above information is an  only  It is not intended as medical advice for individual conditions or treatments  Talk to your doctor, nurse or pharmacist before following any medical regimen to see if it is safe and effective for you

## 2020-07-11 NOTE — DISCHARGE SUMMARY
Discharge- Liv Heard 1970, 44 y o  male MRN: [id number]    Unit/Bed#: -01 Encounter: [id number]    Primary Care Provider: Larissa Etienne PA-C   Date and time admitted to hospital: 7/6/2020  1:37 PM        Secondary hyperparathyroidism of renal origin [facility])  Assessment & Plan  PTH elevated  Vitamin-D low  Patient started on 53478 units of ergocalciferol every 7 days, per nephro  Nephrology follow-up as outpatient    Hyperlipidemia  Assessment & Plan  The 10-year ASCVD risk score (Luis Antonio Perry et al , 2013) is: 9 5%    Values used to calculate the score:      Age: 44 years      Sex: Male      Is Non- : No      Diabetic: No      Tobacco smoker: No      Systolic Blood Pressure: 821 mmHg      Is BP treated: Yes      HDL Cholesterol: 33 mg/dL      Total Cholesterol: 209 mg/dL     Patients lipid profile shows elevated cholesterol  Placed on Lipitor 40 mg, OD  Morbid obesity due to excess calories (HCC)  Assessment & Plan  RD consult, A1C: 5 7  TSH normal    Cellulitis of left lower extremity  Assessment & Plan  Improved with IV antibiotics  IV antibiotics discontinue now  Continue Lasix      Elevated serum creatinine  Assessment & Plan  Improving  Follow-up with nephrology as outpatient    Hypertensive urgency  Assessment & Plan  Blood pressure better controlled now  Discussed with Cardiology  Coreg dose decreased and hydralazine dose increased  Patient will need sleep study as outpatient  Discussed with the patient  He will follow-up PCP regarding that  * Acute heart failure ([facility] [address] )  Assessment & Plan  Wt Readings from Last 3 Encounters:   07/11/20 (!) 164 kg (362 lb)       Patient received aggressive IV diuresis  Improved now  Echo shows left ventricular concentric hypertrophy, grade 2 diastolic dysfunction     Cardiac catheterization did not show any obstructive CAD        Polyuriaresolved as of 7/12/2020  Assessment & Plan  A1C 5 4  Secondary to Lasix  Discharging Physician / Practitioner: Sandra Garza MD  PCP: Miguel Vergara PA-C  Admission Date:   Admission Orders (From admission, onward)     Ordered        07/06/20 1711  Inpatient Admission  Once                   Discharge Date: 07/11/2020    Resolved Problems  Date Reviewed: 7/12/2020          Resolved    Polyuria 7/12/2020     Resolved by  Sandra Garza MD    Insomnia 7/12/2020     Resolved by  Sandra Garza MD          Consultations During Hospital Stay:  · Cardiology, Nephrology    Procedures Performed:   · Cardiac catheterization    Significant Findings / Test Results:   · Cardiac catheterization  CORONARY VESSELS:   --  The coronary circulation is right dominant  --  Left main: The vessel was large sized  Angiography showed no evidence of disease  --  LAD: The vessel was large sized  Angiography showed minor luminal irregularities  D1 and D2 are medium to large caliber long vessel with luminal irregularities  --  Circumflex: The vessel was medium to large sized  LCX trifurcates in to OM1, OM2 and LPL1  LPL1 has 40% stenosis  --  Ramus intermedius: The vessel was small sized  Angiography showed minor luminal irregularities  --  RCA: The vessel was normal sized  Angiography showed minor luminal irregularities  Echo  SUMMARY     PROCEDURE INFORMATION:  This was a technically difficult study  Intravenous contrast ( 1 2mL Definity in NSS, 3 ml) was administered to opacify the left ventricle      LEFT VENTRICLE:  Systolic function was at the lower limits of normal  Ejection fraction was estimated in the range of 50 % to 55 %  There was possible hypokinesis of the basal inferior and basal-mid inferolateral wall(s)  There was severe concentric hypertrophy  Features were consistent with a pseudonormal left ventricular filling pattern, with concomitant abnormal relaxation and increased filling pressure (grade 2 diastolic dysfunction)      Renal ultrasound  Normal    CT angiogram of chest  IMPRESSION:     No evidence for pulmonary embolism      Mild pulmonary vascular congestion      Small pericardial effusion  Incidental Findings:   ·      Test Results Pending at Discharge (will require follow up):   ·      Outpatient Tests Requested:  ·     Complications:      Reason for Admission:  Leg swelling    Hospital Course:     HPI on admission  Cesario Alston is a 44 y o  male w/ morbid obesity who presents with LE swelling worsening over past month  Pt also notes LE pruritis and has been scratching  PT also admis to [facility]  Was taking albuterol inh w/ no relief  No fevers or CP or n/v/d  Admits to polyuria  Has not seen PCP for 1 year  That PCP was in [place] and pt does not have PCP at [facility] time  Hospital course  Patient was given aggressive IV diuresis  Evaluated by Nephrology and Cardiology  Renal function improved slowly  He was given IV antibiotics for cellulitis  Cellulitis improved  Patient remained afebrile and cultures negative  Antibiotics discontinued  Echo showed grade 2 diastolic dysfunction, EF preserved  Cardiac catheterization done which showed no obstructive CAD  Discussed personally with Cardiology  Blood pressure control  Medication readjusted  Coreg dose decreased due to bradycardia, hydralazine increased  Imdur discontinued because of normal cardiac catheterization  Patient currently feels fine  No chest pain or shortness of breath  Clinically stable to be discharged home  Please see above list of diagnoses and related plan for additional information       Condition at Discharge: good     Discharge Day Visit / Exam:     Subjective:    Vitals: Blood Pressure: 150/91 (07/11/20 1127)  Pulse: 68 (07/11/20 1127)  Temperature: (!) 97 3 °F (36 3 °C) (07/11/20 1127)  Temp Source: Oral (07/11/20 0300)  Respirations: 18 (07/11/20 0822)  Height: 5' 11" (180 3 cm) (07/06/20 2148)  Weight - Scale: (!) 164 kg (362 lb) (07/11/20 0554)  SpO2: 95 % (07/11/20 1127)  Exam:   Physical Exam  General- Awake, alert and oriented x 3, looks comfortable, morbidly obese  HEENT- Normocephalic, atraumatic  CVS- Normal S1/ S2, Regular rate and rhythm  Respiratory system- B/L clear breath sounds, no wheezing  Abdomen- Soft, Non distended, no tenderness, Bowel sound- present  CNS- No acute focal neurologic deficit noted  Discussion with Family:     Discharge instructions/Information to patient and family:   See after visit summary for information provided to patient and family  Provisions for Follow-Up Care:  See after visit summary for information related to follow-up care and any pertinent home health orders  Disposition:     Home    For Discharges to [facility] SNF:   · Not Applicable to this Patient - Not Applicable to this Patient    Planned Readmission:      Discharge Statement:  I spent 40 minutes discharging the patient  This time was spent on the day of discharge  I had direct contact with the patient on the day of discharge  Greater than 50% of the total time was spent examining patient, answering all patient questions, arranging and discussing plan of care with patient as well as directly providing post-discharge instructions  Additional time then spent on discharge activities  Discharge Medications:  See after visit summary for reconciled discharge medications provided to patient and family        ** Please Note: This note has been constructed using a voice recognition system **

## 2020-07-12 PROBLEM — G47.00 INSOMNIA: Status: RESOLVED | Noted: 2020-07-07 | Resolved: 2020-07-12

## 2020-07-12 PROBLEM — R35.89 POLYURIA: Status: RESOLVED | Noted: 2020-07-06 | Resolved: 2020-07-12

## 2020-07-12 NOTE — ASSESSMENT & PLAN NOTE
PTH elevated  Vitamin-D low  [place] started on 89985 units of ergocalciferol every 7 days, per nephro    Nephrology follow-up as outpatient

## 2020-07-12 NOTE — ASSESSMENT & PLAN NOTE
The 10-year ASCVD risk score (Viraj Patino et al , 2013) is: 9 5%    Values used to calculate the score:      Age: 44 years      Sex: Male      Is Non- : No      Diabetic: No      Tobacco smoker: No      Systolic Blood Pressure: 529 mmHg      Is BP treated: Yes      HDL Cholesterol: 33 mg/dL      Total Cholesterol: 209 mg/dL     Patients lipid profile shows elevated cholesterol  Placed on Lipitor 40 mg, OD

## 2020-07-12 NOTE — ASSESSMENT & PLAN NOTE
Wt Readings from Last 3 Encounters:   07/11/20 (!) 164 kg (362 lb)       Patient received aggressive IV diuresis  Improved now  Echo shows left ventricular concentric hypertrophy, grade 2 diastolic dysfunction     Cardiac catheterization did not show any obstructive CAD

## 2020-07-12 NOTE — ASSESSMENT & PLAN NOTE
Blood pressure better controlled now  Discussed with Cardiology  Coreg dose decreased and hydralazine dose increased  Patient will need sleep study as outpatient  Discussed with the patient  He will follow-up PCP regarding that

## 2020-07-13 ENCOUNTER — TELEPHONE (OUTPATIENT)
Dept: CARDIOLOGY CLINIC | Facility: CLINIC | Age: 50
End: 2020-07-13

## 2020-07-14 ENCOUNTER — PATIENT OUTREACH (OUTPATIENT)
Dept: CASE MANAGEMENT | Facility: OTHER | Age: 50
End: 2020-07-14

## 2020-07-14 DIAGNOSIS — I50.31 ACUTE DIASTOLIC HEART FAILURE (HCC): Primary | ICD-10-CM

## 2020-07-24 ENCOUNTER — PATIENT OUTREACH (OUTPATIENT)
Dept: CASE MANAGEMENT | Facility: OTHER | Age: 50
End: 2020-07-24

## 2020-07-24 ENCOUNTER — OFFICE VISIT (OUTPATIENT)
Dept: CARDIOLOGY CLINIC | Facility: CLINIC | Age: 50
End: 2020-07-24
Payer: COMMERCIAL

## 2020-07-24 VITALS
DIASTOLIC BLOOD PRESSURE: 80 MMHG | HEIGHT: 71 IN | SYSTOLIC BLOOD PRESSURE: 130 MMHG | OXYGEN SATURATION: 98 % | TEMPERATURE: 98.3 F | HEART RATE: 76 BPM | WEIGHT: 315 LBS | BODY MASS INDEX: 44.1 KG/M2

## 2020-07-24 DIAGNOSIS — E66.01 MORBID OBESITY DUE TO EXCESS CALORIES (HCC): ICD-10-CM

## 2020-07-24 DIAGNOSIS — E78.2 MIXED HYPERLIPIDEMIA: ICD-10-CM

## 2020-07-24 DIAGNOSIS — I50.31 ACUTE DIASTOLIC HEART FAILURE (HCC): Primary | ICD-10-CM

## 2020-07-24 DIAGNOSIS — I16.0 HYPERTENSIVE URGENCY: ICD-10-CM

## 2020-07-24 PROCEDURE — 99214 OFFICE O/P EST MOD 30 MIN: CPT | Performed by: PHYSICIAN ASSISTANT

## 2020-07-24 RX ORDER — ISOSORBIDE DINITRATE 10 MG/1
10 TABLET ORAL 3 TIMES DAILY
Qty: 90 TABLET | Refills: 3 | Status: SHIPPED | OUTPATIENT
Start: 2020-07-24 | End: 2020-07-24 | Stop reason: SDUPTHER

## 2020-07-24 RX ORDER — ISOSORBIDE DINITRATE 10 MG/1
10 TABLET ORAL 3 TIMES DAILY
Qty: 90 TABLET | Refills: 3 | Status: SHIPPED | OUTPATIENT
Start: 2020-07-24 | End: 2021-01-11 | Stop reason: SDUPTHER

## 2020-07-24 NOTE — PROGRESS NOTES
Cardiology Follow Up    Marquita Mccormick  1970  [id number]  [facility] [place], [place] [facility] [address]  [id number] [phone]    1  Acute diastolic CHF  2  Hypertension  3  Hyperlipidemia  4  Obesity  5  Possible obstructive sleep apnea    Interval History: Marquita Mccormick is a 64-year-old male with history of chronic diastolic CHF, hypertension, hyperlipidemia, borderline diabetes mellitus (Last A1c = 5 6%), CKD, obesity who presents for hospital follow up visit  Patient initially presented on 07/06/2020 with bilateral right more than left lower extremity edema, dyspnea with exertion and elevated blood pressures of 200/120  He was thought to be in acute diastolic CHF with overlying right lower extremity cellulitis  He was evaluated by Cardiology and ordered for TTE which revealed EF 50-55%, hypokinesis of basal inferior and basal mid inferior lateral wall, severe LVH, grade 2 diastolic dysfunction and trace TR  Given CAD risk factors and borderline troponin of 0 05, he was also evaluated with a cardiac catheterization on 07/10/2020 which revealed no evidence of CAD  He received aggressive IV diuresis as inpatient and eventually placed on Lasix 40 mg daily  He is RLE cellulitis did improve with IV antibiotics  His blood pressures improved and was discharged on 07/11/2020 with Coreg, Isordil and hydralazine  Since discharge, patient reports his blood pressures have still been fairly high 150s to 170s at home  He reports he has been watching his salt/ fluid intake and has been maintaining his medication regimen every day  He reports he has been taking Coreg, Lasix, hydralazine but has not received Isordil due to not being sent to the pharmacy on discharge  He denies any chest pain or discomfort post-discharge  He denies any orthopnea or PND  LE edema is still present but slowly improving   He is working on getting a scale for home use  He is not been very active since discharge but denies any symptoms with ambulation or lifting  He has noticed some excessive daytime sleepiness/fatigue due to not getting restful sleep at night, requests sleep study today       Patient Active Problem List   Diagnosis    Acute heart failure ([facility] )    Hypertensive urgency    Elevated serum creatinine    Cellulitis of left lower extremity    Morbid obesity due to excess calories (HCC)    Hyperlipidemia    Secondary hyperparathyroidism of renal origin ([facility] )    Vitamin D deficiency     Past Medical History:   Diagnosis Date    Hypertension     Obese      Social History     Socioeconomic History    Marital status:      Spouse name: Not on file    Number of children: Not on file    Years of education: Not on file    Highest education level: Not on file   Occupational History    Not on file   Social Needs    Financial resource strain: Not on file    Food insecurity:     Worry: Not on file     Inability: Not on file    Transportation needs:     Medical: Not on file     Non-medical: Not on file   Tobacco Use    Smoking status: Never Smoker    Smokeless tobacco: Never Used   Substance and Sexual Activity    Alcohol use: Not Currently    Drug use: Not Currently    Sexual activity: Not on file   Lifestyle    Physical activity:     Days per week: Not on file     Minutes per session: Not on file    Stress: Not on file   Relationships    Social connections:     Talks on phone: Not on file     Gets together: Not on file     Attends Bahai service: Not on file     Active member of club or organization: Not on file     Attends meetings of clubs or organizations: Not on file     Relationship status: Not on file    Intimate partner violence:     Fear of current or ex partner: Not on file     Emotionally abused: Not on file     Physically abused: Not on file     Forced sexual activity: Not on file   Other Topics Concern    Not on file   Social History Narrative    Not on file      History reviewed  No pertinent family history  History reviewed  No pertinent surgical history  Current Outpatient Medications:     aspirin 81 mg chewable tablet, Chew 1 tablet (81 mg total) daily, Disp: 30 tablet, Rfl: 0    atorvastatin (LIPITOR) 40 mg tablet, Take 1 tablet (40 mg total) by mouth daily with dinner, Disp: 30 tablet, Rfl: 0    carvedilol (COREG) 12 5 mg tablet, Take 1 tablet (12 5 mg total) by mouth 2 (two) times a day with meals, Disp: 60 tablet, Rfl: 0    ergocalciferol (VITAMIN D2) 50,000 units, Take 1 capsule (50,000 Units total) by mouth once a week, Disp: 4 capsule, Rfl: 0    furosemide (LASIX) 40 mg tablet, Take 1 tablet (40 mg total) by mouth daily, Disp: 30 tablet, Rfl: 0    hydrALAZINE (APRESOLINE) 100 MG tablet, Take 1 tablet (100 mg total) by mouth every 8 (eight) hours, Disp: 90 tablet, Rfl: 0    isosorbide dinitrate (ISORDIL) 10 mg tablet, Take 1 tablet (10 mg total) by mouth 3 (three) times a day, Disp: 90 tablet, Rfl: 3    potassium chloride (K-DUR,KLOR-CON) 10 mEq tablet, Take 1 tablet (10 mEq total) by mouth daily, Disp: 30 tablet, Rfl: 0  No Known Allergies    Labs:  No results displayed because visit has over 200 results  Imaging: Xr Chest 1 View Portable    Result Date: 7/6/2020  Narrative: CHEST INDICATION:   dyspnea  COMPARISON:  None EXAM PERFORMED/VIEWS:  XR CHEST PORTABLE FINDINGS: Cardiomediastinal silhouette appears unremarkable  The lungs are clear  No pneumothorax or pleural effusion  Osseous structures appear within normal limits for patient age  Impression: No acute cardiopulmonary disease  Workstation performed: [id number]     Us Kidney And Bladder    Result Date: 7/7/2020  Narrative: RENAL ULTRASOUND INDICATION:   evaluate for ckd   COMPARISON: None TECHNIQUE:   Ultrasound of the retroperitoneum was performed with a curvilinear transducer utilizing volumetric sweeps and still imaging techniques  FINDINGS: KIDNEYS: Right kidney:  12 2 x 5 0 cm  Normal echogenicity and contour  Cortical thickness:  Normal  No suspicious masses detected  No hydronephrosis  No shadowing calculi  No perinephric fluid collections  Left kidney:  11 9 x 5 0 cm  Normal echogenicity and contour  Cortical thickness:  Normal  No suspicious masses detected  1 6 x 2 0 cm lower pole simple cortical cysts  No hydronephrosis  No shadowing calculi  No perinephric fluid collections  URETERS: Nonvisualized  BLADDER: Normally distended  No focal thickening or mass lesions  Bilateral ureteral jets detected  Impression: Normal renal sonogram  Workstation performed: [id number]     Cta Ed Chest Pe Study    Result Date: 7/6/2020  Narrative: CTA - CHEST WITH IV CONTRAST - PULMONARY ANGIOGRAM INDICATION:   PE suspected, intermediate prob, positive D-dimer  COMPARISON: None  TECHNIQUE: CTA examination of the chest was performed using angiographic technique according to a protocol specifically tailored to evaluate for pulmonary embolism  Axial, sagittal, and coronal 2D reformatted images were created from the source data and  submitted for interpretation  In addition, coronal 3D MIP postprocessing was performed on the acquisition scanner  Radiation dose length product (DLP) for this visit:  1503 06 mGy-cm   This examination, like all CT scans performed in the [facility], was performed utilizing techniques to minimize radiation dose exposure, including the use of iterative reconstruction and automated exposure control  IV Contrast:  85 mL of iohexol (OMNIPAQUE)  FINDINGS: PULMONARY ARTERIAL TREE:  No pulmonary embolus is seen  LUNGS:  There is no focal airspace consolidation  There is no tracheal or endobronchial lesion  PLEURA:  Unremarkable  HEART/GREAT VESSELS:  Mild pulmonary vascular congestion is seen  There is a small pericardial effusion  MEDIASTINUM AND ALMA:  Unremarkable   CHEST WALL AND LOWER NECK:   Unremarkable  VISUALIZED STRUCTURES IN THE UPPER ABDOMEN:  Unremarkable  OSSEOUS STRUCTURES:  No acute fracture or destructive osseous lesion  Impression: No evidence for pulmonary embolism  Mild pulmonary vascular congestion  Small pericardial effusion  Workstation performed: [id number]     Vas Lower Limb Venous Duplex Study, Unilateral/limited    Result Date: 7/7/2020  Narrative:  [facility] REPORT CLINICAL: Indications: Right Limb Pain [M79 609]  Right Edema, unspecified [R60 9] x 1 month  Sores/blistering anterior shin  Patient denies recent travel  Risk Factors The patient has no history of DVT or Recent Trauma  FINDINGS:  Segment       Right            Left                        Impression       Impression       CFV           Normal (Patent)  Normal (Patent)  GSV Inguinal  Not Visualized                       CONCLUSION:  Impression: RIGHT LOWER LIMB No evidence of acute or chronic deep vein thrombosis   No evidence of superficial thrombophlebitis noted  Doppler evaluation shows a normal response to augmentation maneuvers  Popliteal, posterior tibial and anterior tibial arterial Doppler waveforms are triphasic/hyperemic  LEFT LOWER LIMB LIMITED Evaluation shows no evidence of thrombus in the common femoral vein  Doppler evaluation shows a normal response to augmentation maneuvers  Technical findings were given to Dr Aldair Cheatham at time of study  SIGNATURE: Electronically Signed by: Marie Barrios on 2020-07-07 09:35:36 AM      Review of Systems:  Review of Systems   Constitutional: Negative for appetite change, chills, diaphoresis, fatigue and fever  Respiratory: Negative for cough, chest tightness and shortness of breath  Cardiovascular: Negative for chest pain, palpitations and leg swelling  Gastrointestinal: Negative for diarrhea, nausea and vomiting  Endocrine: Negative for cold intolerance and heat intolerance     Genitourinary: Negative for difficulty urinating, dysuria and enuresis  Musculoskeletal: Negative for arthralgias, back pain and gait problem  Allergic/Immunologic: Negative for environmental allergies and food allergies  Neurological: Negative for dizziness, facial asymmetry and headaches  Hematological: Negative for adenopathy  Does not bruise/bleed easily  Psychiatric/Behavioral: Negative for agitation, behavioral problems and confusion  Physical Exam:  Physical Exam   Constitutional: He is oriented to person, place, and time  He appears well-developed and well-nourished  Obese   HENT:   Right Ear: External ear normal    Left Ear: External ear normal    Eyes: Pupils are equal, round, and reactive to light  EOM are normal    Neck: Normal range of motion  Cardiovascular: Normal rate, regular rhythm and normal heart sounds  Exam reveals no gallop and no friction rub  No murmur heard  Pulmonary/Chest: Effort normal    Decreased breath sounds   Abdominal: Soft  Musculoskeletal: Normal range of motion  He exhibits edema  trace to +1 LE edema  Neurological: He is alert and oriented to person, place, and time  He has normal reflexes  Skin: Skin is warm and dry  Psychiatric: He has a normal mood and affect  His behavior is normal  Judgment and thought content normal        Discussion/Summary:  1  Acute diastolic CHF - Some bilateral lower extremity edema noted on exam today but improving  Denies shortness of breath, orthopnea, PND  Patient to continue furosemide 40 mg daily  Patient to start wearing compressive stocking at home  Advised to maintain a salt restricted diet as well as daily weights at home  Patient to report any weight gain of more than 3 lb over 1 day or 5 lb over 1 week  2  Hypertension - today 130/80  Home SBPs reported in the 150-170s  Reorder isordil 10mg TID  Continue with carvedilol 12 5 mg BID, hydralazine 100 mg q8h and Lasix 40 mg daily   Patient instructed to report persistent SBP above 140 or below 90    Advised on a salt restricted diet  3  Hyperlipidemia - fasting lipid panel from 07/08/2020 shows total cholesterol 209, triglycerides 129, HDL 33,   Continue with atorvastatin 40 mg daily  4  Obesity/possible sleep apnea - complains of daytime sleepiness/fatigue  Will refer for outpatient sleep study/evaluation  Follow-up in 3 months or sooner if needed

## 2020-07-27 ENCOUNTER — TELEPHONE (OUTPATIENT)
Dept: CARDIOLOGY CLINIC | Facility: CLINIC | Age: 50
End: 2020-07-27

## 2020-07-27 NOTE — TELEPHONE ENCOUNTER
Pt mother called requesting more information regarding the compression socks, they needs to know what socks to get

## 2020-07-29 ENCOUNTER — PATIENT OUTREACH (OUTPATIENT)
Dept: CASE MANAGEMENT | Facility: OTHER | Age: 50
End: 2020-07-29

## 2020-08-06 ENCOUNTER — TELEPHONE (OUTPATIENT)
Dept: SLEEP CENTER | Facility: CLINIC | Age: 50
End: 2020-08-06

## 2020-08-06 NOTE — TELEPHONE ENCOUNTER
----- Message from Prabha Rivera DO sent at 8/6/2020  7:45 AM EDT -----  Chart reviewed  Study approved    ----- Message -----  From: Haskel Severin, MA  Sent: 8/5/2020   8:41 AM EDT  To: Sleep Medicine Ja[facility] Provider    This sleep study needs approval      If approved please sign and return to clerical pool  If denied please include reasons why  Also provide alternative testing if warranted  Please sign and return to clerical pool

## 2020-08-17 ENCOUNTER — TELEPHONE (OUTPATIENT)
Dept: CARDIOLOGY CLINIC | Facility: CLINIC | Age: 50
End: 2020-08-17

## 2020-08-17 DIAGNOSIS — I50.31 ACUTE DIASTOLIC HEART FAILURE (HCC): ICD-10-CM

## 2020-08-17 RX ORDER — CARVEDILOL 12.5 MG/1
12.5 TABLET ORAL 2 TIMES DAILY WITH MEALS
Qty: 60 TABLET | Refills: 0 | Status: SHIPPED | OUTPATIENT
Start: 2020-08-17 | End: 2020-09-11

## 2020-08-17 RX ORDER — ATORVASTATIN CALCIUM 40 MG/1
40 TABLET, FILM COATED ORAL
Qty: 30 TABLET | Refills: 0 | Status: SHIPPED | OUTPATIENT
Start: 2020-08-17 | End: 2020-09-11

## 2020-08-17 RX ORDER — ERGOCALCIFEROL 1.25 MG/1
50000 CAPSULE ORAL WEEKLY
Qty: 4 CAPSULE | Refills: 0 | Status: SHIPPED | OUTPATIENT
Start: 2020-08-17 | End: 2020-09-09

## 2020-08-17 RX ORDER — FUROSEMIDE 40 MG/1
40 TABLET ORAL DAILY
Qty: 30 TABLET | Refills: 0 | Status: SHIPPED | OUTPATIENT
Start: 2020-08-17 | End: 2020-09-11

## 2020-09-09 DIAGNOSIS — I50.31 ACUTE DIASTOLIC HEART FAILURE (HCC): ICD-10-CM

## 2020-09-09 RX ORDER — ERGOCALCIFEROL 1.25 MG/1
CAPSULE ORAL
Qty: 4 CAPSULE | Refills: 0 | Status: SHIPPED | OUTPATIENT
Start: 2020-09-09 | End: 2020-10-09

## 2020-09-11 DIAGNOSIS — I50.31 ACUTE DIASTOLIC HEART FAILURE (HCC): ICD-10-CM

## 2020-09-11 RX ORDER — ATORVASTATIN CALCIUM 40 MG/1
TABLET, FILM COATED ORAL
Qty: 30 TABLET | Refills: 0 | Status: SHIPPED | OUTPATIENT
Start: 2020-09-11 | End: 2020-10-12

## 2020-09-11 RX ORDER — FUROSEMIDE 40 MG/1
TABLET ORAL
Qty: 30 TABLET | Refills: 0 | Status: SHIPPED | OUTPATIENT
Start: 2020-09-11 | End: 2020-10-12

## 2020-09-11 RX ORDER — CARVEDILOL 12.5 MG/1
TABLET ORAL
Qty: 60 TABLET | Refills: 0 | Status: SHIPPED | OUTPATIENT
Start: 2020-09-11 | End: 2021-01-11 | Stop reason: SDUPTHER

## 2020-10-09 DIAGNOSIS — I50.31 ACUTE DIASTOLIC HEART FAILURE (HCC): ICD-10-CM

## 2020-10-09 RX ORDER — ERGOCALCIFEROL 1.25 MG/1
CAPSULE ORAL
Qty: 4 CAPSULE | Refills: 0 | Status: SHIPPED | OUTPATIENT
Start: 2020-10-09 | End: 2021-02-16 | Stop reason: SDUPTHER

## 2020-10-10 DIAGNOSIS — I50.31 ACUTE DIASTOLIC HEART FAILURE (HCC): ICD-10-CM

## 2020-10-11 DIAGNOSIS — I50.31 ACUTE DIASTOLIC HEART FAILURE (HCC): ICD-10-CM

## 2020-10-12 RX ORDER — FUROSEMIDE 40 MG/1
TABLET ORAL
Qty: 30 TABLET | Refills: 0 | Status: SHIPPED | OUTPATIENT
Start: 2020-10-12 | End: 2021-01-11 | Stop reason: SDUPTHER

## 2020-10-12 RX ORDER — ATORVASTATIN CALCIUM 40 MG/1
TABLET, FILM COATED ORAL
Qty: 30 TABLET | Refills: 0 | Status: SHIPPED | OUTPATIENT
Start: 2020-10-12 | End: 2021-01-11 | Stop reason: SDUPTHER

## 2020-11-02 NOTE — UTILIZATION REVIEW
URGENT/EMERGENT  INPATIENT/SPU AUTHORIZATION REQUEST    Date: 11/02/20            # Pages in this Request:     X New Request   Additional Information for PA#:     Office Contact Name:  Goldie Sweeney Title: Utilization Review, Registed Nurse     Phone: [phone]  Ext  Availability (Date/Time): Wednesday - Friday 8 am- 4 pm    X Inpatient Review  SPU Review        Current       X Late Pick-up   · How your facility was first notified of the Late Pick-up: EVS  · When your facility was first notified of the Late Pick-up (date): 10/26         RECIPIENT INFORMATION    Recipient #:[phone]   Recipient Name: Bear June    YOB: 1970  44 y o  Recipient Alias:     Gender:  X Male  Female Medicaid Eligibility ([address] [place] [address]): INSURANCE INFORMATION    (All other private or governmental health insurance benefits must be utilized prior to billing the [place] Program)    Was this admission the result of an MVA, other accident, assault, injury, fall, gunshot, bite etc ? Yes X No                   If yes, provide a brief description of the incident  Does the recipient have other insurance coverage? Yes X No        Insurance Company Name/Policy #      Did that insurance pay on this claim? Yes  No        Did that insurance deny this claim? Yes  No    If yes, reason for denial:      Does the recipient have Medicare? Yes X No        Did Medicare exhaust prior to this admission? Yes  No            Did Medicare partially pay this claim? Yes  No        Did that insurance deny this claim? Yes  No    If yes, reason for denial:          Was the recipient a prisoner at the time of admission?    Yes X No            PROVIDER INFORMATION    [facility] Provider ID[facility] [id number]    [address] [place] [address] [facility]: [facility] PROM[facility] Provider ID#: [id number]        ADMISSION INFORMATION    Type of Admission: (please choose one)    X ED      Direct    If yes, from where?     Transfer    If yes, transferring hospital (inpatient, rehab, or psych) Provider Name/Provider ID#: Admission Floor or Unit Type: TELEMETRY    Dates/Times:        ED Date/Time: 7/6/2020  1255        Observation Date/Time:         Admission Date/Time: 7/6/20 1711        Discharge or Transfer Date/Time: 7/11/2020  1039        DIAGNOSIS/PROCEDURE CODES    Primary Diagnosis Code/Primary Diagnosis Code description:   Hypertension [I10]  Congestive heart failure (CHF) (HCC) [I50 9]  Cellulitis of right lower extremity [L03 115]  Bilateral lower extremity edema [R60 0]  (MAY RE-ORDER DX CODES)    Additional Diagnosis Code(s) and Description(s)-(up to three additional codes):     Procedure Code (one) and description: [address] &PRESSURE L HEART        CLINICAL INFORMATION - PRIOR ADMISSION ONLY    Is there a prior admission with a discharge date within 30 days of the date of this admission? X No (Proceed to the next section - "Clinical Information - General Review Checklist:)      Yes (Provide the following information)     Prior admission dates:    MA Prior Authorization Number:        Review Outcome:     Diagnosis Code(s)/Description:    Procedure Code/Description:    Findings:    Treatment:    Condition on Discharge:   Vitals:    Labs:   Imaging:   Medications: Follow-up Instructions:    Disposition:        CLINICAL INFORMATION - GENERAL REVIEW CHECKLIST    EMERGENCY DEPARTMENT: (Proceed to "ADMISSION" if Direct Admission)    Presenting Signs/Symptoms:    Leg Swelling       RLE swelling x1 month, new onset cough and intermittent fevers  report recent covid negative        Assessment/Plan: 43 yo male to ED from home w/ LE swelling worsening over the past month   + LE pruritis and has been scratching   + CAMPBELL   Using albuterol w/ o relied  + polyuria  Has not seen his PCP for a yr    Admitted IP status w/ acute hrt failure plan to check echo , IV lasix , daily weight , I&O , cardiology consult  + cellulitis LLE IV ancef , f/u BC and am pct   Elevated creatine ECC 69 9 , will get renal consult in setting of volume overload, watch cr w/ lasix and getting dye load for CTA   HTN IV labetalol to get SBP < 190       PE : edema  +2-3 pitting LE      7/7 0300 Quick note   HTN urgency labetalol given x2 IV , cont to have systolic BP > 821    Cardene gtt started along w/ po clonidine       7/7 0353 Quick note Critical Care   SBP on arrival was 237, would receommend approx 20% reduction within 24 hours with goal SBP between 180-200   Would give additional dose of lasix now   Would start amlodipine for HTN, will add PRN hydralazine   Could also consider adding clonidine   Agree with echo  Justino Connolly also check repeat EKG   Will check additional troponin   Cardiology and nephrology consult pending   Monitor renal indices adn I/O and daily weights closely in light of SUMI versus CKD      Medication/treatment prior to arrival in the ED:     Past Medical History:     Past Medical History:   Diagnosis Date    Hypertension     Obese        Clinical Exam:     Initial Vital Signs: (Temp, Pulse, Resp, and BP)   ED Triage Vitals   Temperature Pulse Respirations Blood Pressure SpO2   07/06/20 1307 07/06/20 1307 07/06/20 1307 07/06/20 1308 07/06/20 1307   98 °F (36 7 °C) 98 (!) 24 (!) 237/121 94 %      Temp Source Heart Rate Source Patient Position - Orthostatic VS BP Location FiO2 (%)   07/10/20 0750 07/06/20 1354 07/06/20 1354 07/06/20 1354 --   Oral Monitor Lying Left arm       Pain Score       07/06/20 1307       No Pain           Pertinent Repeat Vital Signs: (include times they were obtained)  07/07/20 07:57:45   98 6 °F (37 °C)   72   17   174/106Abnormal    129   90 %         07/07/20 0420      79      182/119Abnormal     140   95 %         BP: PO Norvasc given as per MD orders at 07/07/20 0420        07/07/20 0331      83      180/119Abnormal    139   95 %         07/07/20 0255                     None (Room air)      07/07/20 0240            218/105Abnormal              Lying   BP: Manual BP at 07/07/20 0240        07/07/20 0239         20               Lying   07/07/20 0235      80      187/120Abnormal     142   94 %   None (Room air)   Lying   BP: Automatic BP at 07/07/20 0235        07/07/20 0204      79      220/100Abnormal        92 %   None (Room air)   Lying   BP: OT dose Labetalol given  MD aware   Manual BP at 07/07/20 0204        07/07/20 0139      75   20   192/126Abnormal     148   94 %   None (Room air)   Lying   BP: rechecked at 07/07/20 0139        07/06/20 23:23:27   99 3 °F (37 4 °C)   88      196/128Abnormal     151   95 %         BP: prn labetalol given  MD aware   at 07/06/20 2323        07/06/20 21:48:33   98 6 °F (37 °C)   89   20   204/135Abnormal    158   96 %      Lying   07/06/20 2144         20            None (Room air)   Lying   07/06/20 1915      86   16   175/108Abnormal       93 %         07/06/20 1830      84   19   202/123Abnormal    154   94 %         07/06/20 1800      89   23Abnormal    196/124Abnormal    154   95 %         07/06/20 1745      84   18   198/117Abnormal    151   95 %         07/06/20 1730      89   18   199/123Abnormal    154   96 %         07/06/20 1700      87   29Abnormal    200/120Abnormal    155   94 %         07/06/20 1445      92   30Abnormal    213/103Abnormal    148   96 %   None (Room air)   Lying   07/06/20 1430      92   26Abnormal    215/104Abnormal    148   94 %   None (Room air)   Lying   07/06/20 1400      100   29Abnormal    220/123Abnormal    164   95 %   None (Room air)   Lying   07/06/20 1354      99   26Abnormal    221/134Abnormal       92 %   None (Room air)   Lying   07/06/20 1346                     None (Room air)      07/06/20 1308            237/121Abnormal                 BP: taken x2 at 07/06/20 1308                Pertinent Sustained Findings: (include times they were obtained)    Weight in Kilograms:  07/07/20 (!) 170 kg (375 lb 3 6 oz)         Pertinent Labs (results):  Results from last 7 days   Lab Units 07/06/20  1358   SARS-COV-2   Negative            Results from last 7 days   Lab Units 07/07/20  0504 07/06/20  1358   WBC Thousand/uL 8 15 9 03   HEMOGLOBIN g/dL 14 2 15 4   HEMATOCRIT % 46 1 50 4*   PLATELETS Thousands/uL 189 202   NEUTROS ABS Thousands/µL  --  6 80            Results from last 7 days   Lab Units 07/07/20  0504 07/06/20  1358   SODIUM mmol/L 141 141   POTASSIUM mmol/L 3 9 5 1   CHLORIDE mmol/L 107 107   CO2 mmol/L 27 29   ANION GAP mmol/L 7 5   BUN mg/dL 24 24   CREATININE mg/dL 1 82* 1 98*   EGFR ml/min/1 73sq m 42 38   CALCIUM mg/dL 8 2* 8 7   MAGNESIUM mg/dL 2 1  --    PHOSPHORUS mg/dL 3 9  --            Results from last 7 days   Lab Units 07/06/20  1358   AST U/L 37   ALT U/L 31   ALK PHOS U/L 112   TOTAL PROTEIN g/dL 7 8   ALBUMIN g/dL 3 4*   TOTAL BILIRUBIN mg/dL 0 70            Results from last 7 days   Lab Units 07/07/20  0504 07/06/20  1358   GLUCOSE RANDOM mg/dL 101 92            Results from last 7 days   Lab Units 07/07/20  0504 07/06/20  1358   TROPONIN I ng/mL 0 05* 0 04           Results from last 7 days   Lab Units 07/06/20  1358   D-DIMER QUANTITATIVE ug/ml FEU 2 02*              Results from last 7 days   Lab Units 07/06/20  1358   PROTIME seconds 14 4   INR   1 10   PTT seconds 35           Results from last 7 days   Lab Units 07/06/20  1358   TSH 3RD GENERATON uIU/mL 1 651           Results from last 7 days   Lab Units 07/07/20  0504   PROCALCITONIN ng/ml <0 05           Results from last 7 days   Lab Units 07/06/20  1555   LACTIC ACID mmol/L 0 7           Results from last 7 days   Lab Units 07/06/20  1358   NT-PRO BNP pg/mL 1,630*           Results from last 7 days   Lab Units 07/07/20  0352   CLARITY UA   Clear   COLOR UA   Yellow   SPEC GRAV UA   1 015   PH UA   6 0   GLUCOSE UA mg/dl Negative   KETONES UA mg/dl Negative   BLOOD UA   Trace-lysed*   PROTEIN UA mg/dl Trace*   NITRITE UA   Negative   BILIRUBIN UA   Negative   UROBILINOGEN UA E U /dl 0 2   LEUKOCYTES UA   Negative   WBC UA /hpf None Seen   RBC UA /hpf 0-1*   BACTERIA UA /hpf None Seen   EPITHELIAL CELLS WET PREP /hpf Occasional            Results from last 7 days   Lab Units 07/06/20  1555 07/06/20  1540   BLOOD CULTURE   Received in Microbiology Lab  Culture in Progress  Received in Microbiology Lab   Culture in Progress           Radiology (results):  7/6 CTA chest   No evidence for pulmonary embolism        Mild pulmonary vascular congestion        Small pericardial effusion   7/6 PCXR No acute cardiopulmonary disease  7/6 EKG - NSR         Other tests (results):    Tests pending final results:    Treatment in the ED:   Medication Administration from 07/06/2020 1255 to 07/06/2020 2138       Date/Time Order Dose Route Action Action by Comments                07/06/2020 1556 sodium chloride 0 9 % bolus 1,000 mL 1,000 mL Intravenous New Bag                  07/06/2020 1540 ceftriaxone (ROCEPHIN) 1 g/50 mL in dextrose IVPB 1,000 mg Intravenous New Bag       07/06/2020 1557 Labetalol HCl (NORMODYNE) injection 10 mg 10 mg Intravenous Given                  07/06/2020 1729 Labetalol HCl (NORMODYNE) injection 20 mg 20 mg Intravenous Given       07/06/2020 1734 furosemide (LASIX) injection 40 mg 40 mg Intravenous Given       07/06/2020 1925 enoxaparin (LOVENOX) subcutaneous injection 60 mg 60 mg Subcutaneous Given             Meds: Name, dose, route, time, number of doses given        Nebulizer treatments: Type, total number given      IVs: Type, rate, total amt  given          Other treatments:       Change in condition while in the ED:       Response to ED Treatment:           OBSERVATION: (Proceed to "ADMISSION" if Direct Admission)    Orders written during the observation period  Meds Name, dose, route, time, how may doses given:  PRN Meds Name, dose, route, time, how many doses given within the first 24 hrs :  IVs Type, rate, and total amt  ordered/given:  Labs, imaging, other:  Consults and findings:    Test Results during the observation period  Pertinent Lab tests (dates/results):  Culture results (blood, urine, spinal, wound, respiratory, etc ):  Imaging tests (dates/results):  EKG (dates/results): Other test (dates/results):  Tests pending (dates/results):    Surgical or Invasive Procedures during the observation period  Name of surgery/procedure:  Date & Time:  Patient Response:  Post-operative orders:  Operative Report/Findings:    Response to Treatment, Major Change in Condition, Major Charge in Treatment during the observation period          ADMISSION:    DIRECT Admissions Only:    · Presenting Signs/Symptoms:   ·   · Medication/treatment prior to arrival:  ·   · Past Medical History:  ·   · Clinical Exam on admission:  ·   · Vital Signs on admission: (Temp, Pulse, Resp, and BP)  ·   · Weight in kilograms:     ALL Admissions:    Admission Orders and Other Orders written within the first 24 hrs after admission  Bladder scan   Daily weight   I&O   2000 ml fluid restriction   EKG prn cp   Up as kayce   Tele   IP CONSULT TO NUTRITION SERVICES  IP CONSULT TO CARDIOLOGY  IP CONSULT TO NEPHROLOGY  IP CONSULT TO CASE MANAGEMENT       Meds Name, dose, route, time, how may doses given:  amLODIPine 10 mg Oral Daily   cefazolin 2,000 mg Intravenous Q8H   enoxaparin 60 mg Subcutaneous BID   furosemide 40 mg Intravenous BID (diuretic)      Continuous IV Infusions:  niCARdipine (CARDENE) 25 mg (STANDARD CONCENTRATION) in sodium chloride 0 9% 250 mL    Rate:  mL/hr Dose: 1-15 mg/hr  Freq: Titrated Route: IV  Start: 07/07/20 0300 End: 07/07/20 0321     PRN Meds:     acetaminophen 650 mg Oral Q6H PRN   hydrALAZINE 10 mg Intravenous Q6H PRN        PRN Meds Name, dose, route, time, how many doses given within the first 24 hrs :  IVs Type, rate, and total amt  ordered/given:  Labs, imaging, other:      Consults and findings:  Acute heart failure ([address] )  Assessment & Plan      Wt Readings from Last 3 Encounters:   07/06/20 (!) 166 kg (365 lb)      Check echo  IV Lasix  Daily weights  Is/Os  Cardio consult              Morbid obesity due to excess calories (HCC)  Assessment & Plan  RD consult  TSH WNL     Cellulitis of left lower extremity  Assessment & Plan  Rocephin given in ER  No sepsis  IV Ancef  F/u B Cx and AM procal     Polyuria  Assessment & Plan  Bladder scan  A1C     Elevated serum creatinine  Assessment & Plan  Estimated Creatinine Clearance: 69 9 mL/min (A) (by C-G formula based on SCr of 1 98 mg/dL (H))  Renal consult in setting of volume overload  Watch Cr w/ Lasix and getting dye load for CTA  UA     Hypertensive urgency  Assessment & Plan  Labetalol IV to get SBP under 190  Cardio and renal consults       Anticipated Length of Stay:  Patient will be admitted on an Inpatient basis with an anticipated length of stay of  At least 2 midnights  Justification for Hospital Stay: need to tx acute CHF     Consultation - Cardiology   Saw Jon 44 y o  male MRN: [id number]  Unit/Bed#: -01 Encounter: [id number]  07/07/20  9:57 AM     Assessment/ Plan:  1  Asymmetrical lower extremity edema/ Exertional shortness       -right >left likely secondary to cellulitis in addition to peripheral edema  - DVT ruled out, mild cellulitis in the right leg noted  - Peripheral edema be due to venous insufficiency versus fluid overload from cardiac dysfunction  - Mildly elevated troponin 0 05 likely due to demand ischemia     Plan  ECHO ordered to assess cardiac function  Trend troponins 6 hours apart x2, to evaluate ongoing ischemia  Increase IV Lasix to 80 mg twice a day  Antibiotics per primary team   Check A1c, lipid panel  monitor I/O, weight  Nutrition consult   Patient may require ischemia workup once blood pressure is controlled           2   Hypertensive Emergency  - Uncontrolled blood pressure SBP in 200s with evidence of kidney injury,   - BP improved to SBP 170s,  DBP- 110s  - Currently on amlodipine 10 mg, carvedilol 12 5 mg twice daily and clonidin added by nephrology  - monitor BP  - patient also needs workup for sleep apnea, check nocturnal pulse oximetry        3  JOHN vs CKD due to uncontrolled hypertension  -- Baseline Cr is unknown   -- nephrology on board for further management      4  Morbidity  obesity- weight loss recommended      NEPHROLOGY CONSULT:     1  Acute kidney injury:  Baseline serum creatinine is unknown   -patient presented with elevated creatinine of 1 8 with estimated GFR 42   -no prior history of abnormal renal parameters as per patient   -potential for probable underlying CKD given patient uncontrolled hypertension     -obtain renal ultrasound  -urinalysis reveals trace protein and trace blood   -obtain urine protein creatinine ratio   -patient was exposed to contrast via CT angiogram yesterday and is therefore at risk for worsening of John       2  Hypertensive urgency:  Blood pressures have been persistently greater than 191 mmHg systolic   -element of volume overload as well contributing to elevated blood pressure and therefore on Lasix 40 mg twice daily  -currently on amlodipine 10 mg once daily   -initiate carvedilol 12 5 mg p o  B i d   -clonidine 0 2 mg b i d      3  Lower extremity edema:  Progressive worsening lower extremity edema along with findings of probable CHF noted   -patient is scheduled for 2D echo  -PE study done did not reveal pulmonary embolism        4  Proteinuria:  Noted to have trace protein urine   -as stated above will quantify his proteinuria  -order paraproteinemia workup  Test Results after admission  Pertinent Lab tests (dates/results):  Culture results (blood, urine, spinal, wound, respiratory, etc ):  Imaging tests (dates/results):  EKG (dates/results):   Other test (dates/results):  Tests pending (dates/results):    Surgical or Invasive Procedures  Name of surgery/procedure:  Date & Time:  Patient Response:  Post-operative orders:  Operative Report/Findings:    Response to Treatment, Major Change in Condition, Major Charge in Treatment anytime during admission    Disposition on Discharge  Home, Rehab, SNF, LTC, Shelter, etc : Home/Self Care    Cease to Breathe (CTB)  If a patient expires during an admission, in addition to the above information, please include:    Summary/timeline of the patient's decline in condition:    Medications and treatment:    Patient response to treatment:    Date and time patient ceased to breathe:        Is there a Readmission that follows this admission? Yes X No    If yes, reason for denial:          InterQual Review    InterQual Criteria Met: X Yes  No  N/A        Please include the InterQual Review, InterQual year/version used, and the criteria selected:   Patient: [facility]  Review Number: [id number]  Review Status:  In Primary  Criteria Status: Critical Met  Day Met: Episode Day 1     Condition Specific: Yes        OUTCOMES  Outcome Type: Primary           REVIEW DETAILS     Product: Dayannawaylon Denney Adult  Subset: Hypertension      (Symptom or finding within 24h)         (Excludes PO medications unless noted)          [X] Select Day, One:              [X] Episode Day 1, One:                  [X] CRITICAL, All:                      [X] Hypertensive emergency (includes pheochromocytoma induced hypertension), >= One:                          [X] Systolic blood pressure > 180 mmHg                      [X] Finding, >= One:                          [X] Acute end-organ damage, >= One:                              [X] Acute kidney injury (SUMI) and, >= One:                                  [X] Creatinine > 1 5 mg/dL(114 4 µmol/L) and > baseline                          [X] Symptom or finding, >= One:                              [X] Dyspnea                      [X] IV antihypertensive administration, Both:                          [X] Medication, >= One:                              [X] Centrally acting antihypertensive                          [X] Intervention, >= One:                              [X] Continuous IV medication     Version: InterQual® 2019 1  Kaylene Orr and InterQual®  © 2019 [employer] [address] and/or one of its [place]  All Rights Reserved  CPT only © 2018 [employer]  All Rights Reser        PLEASE SUBMIT THE COMPLETED FORM TO THE DEPARTMENT OF HUMAN SERVICES - DIVISION OF CLINICAL  REVIEW VIA FAX [phone] or VIA E-MAIL TO [employer]    Signature: Martha Brian Date:  11/02/20    Confidentiality Notice: The documents accompanying this telecopy may contain confidential information belonging to the sender  The information is intended only for the use of the individual named above  If you are not the intended recipient, you are hereby notified  That any disclosure, copying, distribution or taking of any telecopy is strictly prohibited

## 2021-01-11 DIAGNOSIS — I50.31 ACUTE DIASTOLIC HEART FAILURE (HCC): ICD-10-CM

## 2021-01-11 DIAGNOSIS — I16.0 HYPERTENSIVE URGENCY: ICD-10-CM

## 2021-01-11 RX ORDER — FUROSEMIDE 40 MG/1
40 TABLET ORAL DAILY
Qty: 90 TABLET | Refills: 3 | Status: SHIPPED | OUTPATIENT
Start: 2021-01-11

## 2021-01-11 RX ORDER — ISOSORBIDE DINITRATE 10 MG/1
10 TABLET ORAL 3 TIMES DAILY
Qty: 270 TABLET | Refills: 3 | Status: SHIPPED | OUTPATIENT
Start: 2021-01-11

## 2021-01-11 RX ORDER — POTASSIUM CHLORIDE 750 MG/1
10 TABLET, EXTENDED RELEASE ORAL DAILY
Qty: 90 TABLET | Refills: 3 | Status: SHIPPED | OUTPATIENT
Start: 2021-01-11 | End: 2022-01-06

## 2021-01-11 RX ORDER — CARVEDILOL 12.5 MG/1
12.5 TABLET ORAL 2 TIMES DAILY WITH MEALS
Qty: 180 TABLET | Refills: 3 | Status: SHIPPED | OUTPATIENT
Start: 2021-01-11

## 2021-01-11 RX ORDER — ATORVASTATIN CALCIUM 40 MG/1
40 TABLET, FILM COATED ORAL
Qty: 90 TABLET | Refills: 3 | Status: SHIPPED | OUTPATIENT
Start: 2021-01-11

## 2021-01-11 NOTE — TELEPHONE ENCOUNTER
Meds pending  Patient scheduled to follow up with you on 3/23/2021 and last seen by Katharina Lewis on 7/24/2020

## 2021-02-16 DIAGNOSIS — I50.31 ACUTE DIASTOLIC HEART FAILURE (HCC): ICD-10-CM

## 2021-02-16 RX ORDER — ERGOCALCIFEROL 1.25 MG/1
50000 CAPSULE ORAL WEEKLY
Qty: 4 CAPSULE | Refills: 0 | Status: SHIPPED | OUTPATIENT
Start: 2021-02-16 | End: 2021-03-09

## 2021-02-16 NOTE — TELEPHONE ENCOUNTER
Pt called requesting refills on vit D  Uday refill vit d on 10/09/2020 quantity of 4 pill one  Month supply   Office visit with Chandler Jones was on 07/24/2020  Should pt cont taking Vit D 50,000

## 2021-02-16 NOTE — TELEPHONE ENCOUNTER
We can fill 1 more time   4  Pills  One every week  No refills  Patient should get in touch with his primary care physician and get the vitamin-D levels checked and then decide whether he needs further refills

## 2021-03-09 DIAGNOSIS — I50.31 ACUTE DIASTOLIC HEART FAILURE (HCC): ICD-10-CM

## 2021-03-09 RX ORDER — ERGOCALCIFEROL 1.25 MG/1
CAPSULE ORAL
Qty: 4 CAPSULE | Refills: 0 | Status: SHIPPED | OUTPATIENT
Start: 2021-03-09 | End: 2021-04-08 | Stop reason: SDUPTHER

## 2021-03-10 DIAGNOSIS — Z23 ENCOUNTER FOR IMMUNIZATION: ICD-10-CM

## 2021-04-08 DIAGNOSIS — I50.31 ACUTE DIASTOLIC HEART FAILURE (HCC): ICD-10-CM

## 2021-04-08 RX ORDER — ERGOCALCIFEROL 1.25 MG/1
50000 CAPSULE ORAL WEEKLY
Qty: 4 CAPSULE | Refills: 0 | Status: SHIPPED | OUTPATIENT
Start: 2021-04-08

## 2021-04-08 NOTE — TELEPHONE ENCOUNTER
[facility] gave pt one month supply of Vit D2 50,000 units to take once a week  Do you want pt to cont with this